# Patient Record
Sex: MALE | Race: WHITE | Employment: OTHER | URBAN - METROPOLITAN AREA
[De-identification: names, ages, dates, MRNs, and addresses within clinical notes are randomized per-mention and may not be internally consistent; named-entity substitution may affect disease eponyms.]

---

## 2017-10-02 PROBLEM — R10.84 GENERALIZED ABDOMINAL PAIN: Status: ACTIVE | Noted: 2017-10-02

## 2017-10-02 PROBLEM — R11.2 N&V (NAUSEA AND VOMITING): Status: ACTIVE | Noted: 2017-10-02

## 2017-10-02 PROBLEM — K80.10 CHRONIC CALCULOUS CHOLECYSTITIS: Status: ACTIVE | Noted: 2017-10-02

## 2017-10-03 RX ORDER — CEFAZOLIN SODIUM IN 0.9 % NACL 2 G/50 ML
2 INTRAVENOUS SOLUTION, PIGGYBACK (ML) INTRAVENOUS ONCE
Status: CANCELLED | OUTPATIENT
Start: 2017-10-31 | End: 2017-10-31

## 2017-10-24 ENCOUNTER — HOSPITAL ENCOUNTER (OUTPATIENT)
Dept: SURGERY | Age: 62
Discharge: HOME | End: 2017-10-24
Payer: COMMERCIAL

## 2017-10-24 VITALS
SYSTOLIC BLOOD PRESSURE: 142 MMHG | HEART RATE: 82 BPM | DIASTOLIC BLOOD PRESSURE: 84 MMHG | TEMPERATURE: 98.8 F | BODY MASS INDEX: 27.11 KG/M2 | WEIGHT: 200.13 LBS | HEIGHT: 72 IN | RESPIRATION RATE: 18 BRPM | OXYGEN SATURATION: 98 %

## 2017-10-24 LAB
ALBUMIN SERPL-MCNC: 3.4 G/DL (ref 3.2–4.6)
ALBUMIN/GLOB SERPL: 0.9 {RATIO} (ref 1.2–3.5)
ALP SERPL-CCNC: 105 U/L (ref 50–136)
ALT SERPL-CCNC: 47 U/L (ref 12–65)
ANION GAP SERPL CALC-SCNC: 7 MMOL/L (ref 7–16)
AST SERPL-CCNC: 34 U/L (ref 15–37)
BILIRUB SERPL-MCNC: 0.8 MG/DL (ref 0.2–1.1)
BUN SERPL-MCNC: 17 MG/DL (ref 8–23)
CALCIUM SERPL-MCNC: 9.3 MG/DL (ref 8.3–10.4)
CHLORIDE SERPL-SCNC: 106 MMOL/L (ref 98–107)
CO2 SERPL-SCNC: 25 MMOL/L (ref 21–32)
CREAT SERPL-MCNC: 0.85 MG/DL (ref 0.8–1.5)
ERYTHROCYTE [DISTWIDTH] IN BLOOD BY AUTOMATED COUNT: 13 % (ref 11.9–14.6)
GLOBULIN SER CALC-MCNC: 4 G/DL (ref 2.3–3.5)
GLUCOSE SERPL-MCNC: 95 MG/DL (ref 65–100)
HCT VFR BLD AUTO: 45.8 % (ref 41.1–50.3)
HGB BLD-MCNC: 16.3 G/DL (ref 13.6–17.2)
MCH RBC QN AUTO: 31 PG (ref 26.1–32.9)
MCHC RBC AUTO-ENTMCNC: 35.6 G/DL (ref 31.4–35)
MCV RBC AUTO: 87.2 FL (ref 79.6–97.8)
PLATELET # BLD AUTO: 190 K/UL (ref 150–450)
PMV BLD AUTO: 11.6 FL (ref 10.8–14.1)
POTASSIUM SERPL-SCNC: 3.9 MMOL/L (ref 3.5–5.1)
PROT SERPL-MCNC: 7.4 G/DL (ref 6.3–8.2)
RBC # BLD AUTO: 5.25 M/UL (ref 4.23–5.67)
SODIUM SERPL-SCNC: 138 MMOL/L (ref 136–145)
WBC # BLD AUTO: 13.4 K/UL (ref 4.3–11.1)

## 2017-10-24 PROCEDURE — 80053 COMPREHEN METABOLIC PANEL: CPT | Performed by: SURGERY

## 2017-10-24 PROCEDURE — 85027 COMPLETE CBC AUTOMATED: CPT | Performed by: SURGERY

## 2017-10-24 RX ORDER — VALSARTAN 160 MG/1
160 TABLET ORAL
COMMUNITY
End: 2019-08-27

## 2017-10-24 RX ORDER — ATORVASTATIN CALCIUM 20 MG/1
20 TABLET, FILM COATED ORAL
COMMUNITY

## 2017-10-24 RX ORDER — CHOLECALCIFEROL TAB 125 MCG (5000 UNIT) 125 MCG
5000 TAB ORAL
COMMUNITY

## 2017-10-24 NOTE — PERIOP NOTES
Recent Results (from the past 12 hour(s))   CBC W/O DIFF    Collection Time: 10/24/17 11:00 AM   Result Value Ref Range    WBC 13.4 (H) 4.3 - 11.1 K/uL    RBC 5.25 4.23 - 5.67 M/uL    HGB 16.3 13.6 - 17.2 g/dL    HCT 45.8 41.1 - 50.3 %    MCV 87.2 79.6 - 97.8 FL    MCH 31.0 26.1 - 32.9 PG    MCHC 35.6 (H) 31.4 - 35.0 g/dL    RDW 13.0 11.9 - 14.6 %    PLATELET 213 438 - 745 K/uL    MPV 11.6 10.8 - 83.4 FL   METABOLIC PANEL, COMPREHENSIVE    Collection Time: 10/24/17 11:00 AM   Result Value Ref Range    Sodium 138 136 - 145 mmol/L    Potassium 3.9 3.5 - 5.1 mmol/L    Chloride 106 98 - 107 mmol/L    CO2 25 21 - 32 mmol/L    Anion gap 7 7 - 16 mmol/L    Glucose 95 65 - 100 mg/dL    BUN 17 8 - 23 MG/DL    Creatinine 0.85 0.8 - 1.5 MG/DL    GFR est AA >60 >60 ml/min/1.73m2    GFR est non-AA >60 >60 ml/min/1.73m2    Calcium 9.3 8.3 - 10.4 MG/DL    Bilirubin, total 0.8 0.2 - 1.1 MG/DL    ALT (SGPT) 47 12 - 65 U/L    AST (SGOT) 34 15 - 37 U/L    Alk. phosphatase 105 50 - 136 U/L    Protein, total 7.4 6.3 - 8.2 g/dL    Albumin 3.4 3.2 - 4.6 g/dL    Globulin 4.0 (H) 2.3 - 3.5 g/dL    A-G Ratio 0.9 (L) 1.2 - 3.5       Please note wbc result.  Results routed and called to Tee at office

## 2017-10-24 NOTE — PERIOP NOTES
Patient verified name, , and surgery as listed in [facility]. Patient provided medical/health information and PTA medications to the best of their ability. TYPE  CASE:11  Orders per surgeon: were Received  Labs per surgeon:cbc and cmp collected and results are in [facility]. Labs per anesthesia protocol: no additional  EKG  :  EKG is not required per protocol    Patient provided with and instructed on education handouts including Guide to Surgery, blood transfusions, pain management, and hand hygiene for the family and community, and [facility] brochure. Hibiclens  and instructions given per hospital policy. Instructed patient to continue previous medications as prescribed prior to surgery unless otherwise directed and to take the following medications the day of surgery according to anesthesia guidelines : levothyroxine . Instructed patient to hold  the following medications: all vitamins and supplements 7 days prior to surgery and all nsaids 5 days prior to surgery    Original medication prescription bottles were not visualized during patient appointment. Patient teach back successful and patient demonstrates knowledge of instruction.

## 2017-10-30 ENCOUNTER — ANESTHESIA EVENT (OUTPATIENT)
Dept: SURGERY | Age: 62
End: 2017-10-30
Payer: COMMERCIAL

## 2017-10-31 ENCOUNTER — ANESTHESIA (OUTPATIENT)
Dept: SURGERY | Age: 62
End: 2017-10-31
Payer: COMMERCIAL

## 2017-10-31 ENCOUNTER — HOSPITAL ENCOUNTER (OUTPATIENT)
Age: 62
Setting detail: OUTPATIENT SURGERY
Discharge: HOME | End: 2017-10-31
Attending: SURGERY | Admitting: SURGERY
Payer: COMMERCIAL

## 2017-10-31 VITALS
BODY MASS INDEX: 26.32 KG/M2 | RESPIRATION RATE: 14 BRPM | OXYGEN SATURATION: 98 % | SYSTOLIC BLOOD PRESSURE: 120 MMHG | DIASTOLIC BLOOD PRESSURE: 77 MMHG | TEMPERATURE: 98 F | WEIGHT: 194.31 LBS | HEART RATE: 64 BPM | HEIGHT: 72 IN

## 2017-10-31 PROCEDURE — 74011000250 HC RX REV CODE- 250

## 2017-10-31 PROCEDURE — 74011250636 HC RX REV CODE- 250/636

## 2017-10-31 PROCEDURE — 76210000020 HC REC RM PH II FIRST 0.5 HR: Performed by: SURGERY

## 2017-10-31 PROCEDURE — 77030020782 HC GWN BAIR PAWS FLX 3M -B: Performed by: NURSE ANESTHETIST, CERTIFIED REGISTERED

## 2017-10-31 PROCEDURE — 77030035048 HC TRCR ENDOSC OPTCL COVD -B: Performed by: SURGERY

## 2017-10-31 PROCEDURE — 77030008518 HC TBNG INSUF ENDO STRY -B: Performed by: SURGERY

## 2017-10-31 PROCEDURE — 76010000149 HC OR TIME 1 TO 1.5 HR: Performed by: SURGERY

## 2017-10-31 PROCEDURE — 74011000250 HC RX REV CODE- 250: Performed by: SURGERY

## 2017-10-31 PROCEDURE — 77030025088 HC APPL CLP LIG 1 COVD -B: Performed by: SURGERY

## 2017-10-31 PROCEDURE — 77030035044 HC TRCR ENDOSC VRSPRT BLDLSS COVD -C: Performed by: SURGERY

## 2017-10-31 PROCEDURE — 76210000063 HC OR PH I REC FIRST 0.5 HR: Performed by: SURGERY

## 2017-10-31 PROCEDURE — 77030008467 HC STPLR SKN COVD -B: Performed by: SURGERY

## 2017-10-31 PROCEDURE — 77030037892: Performed by: SURGERY

## 2017-10-31 PROCEDURE — 77030000038 HC TIP SCIS LAPSCP SURI -B: Performed by: SURGERY

## 2017-10-31 PROCEDURE — 77030035051: Performed by: SURGERY

## 2017-10-31 PROCEDURE — 76060000033 HC ANESTHESIA 1 TO 1.5 HR: Performed by: SURGERY

## 2017-10-31 PROCEDURE — 77030019908 HC STETH ESOPH SIMS -A: Performed by: NURSE ANESTHETIST, CERTIFIED REGISTERED

## 2017-10-31 PROCEDURE — 77030032490 HC SLV COMPR SCD KNE COVD -B: Performed by: SURGERY

## 2017-10-31 PROCEDURE — 74011250636 HC RX REV CODE- 250/636: Performed by: ANESTHESIOLOGY

## 2017-10-31 PROCEDURE — 77030031139 HC SUT VCRL2 J&J -A: Performed by: SURGERY

## 2017-10-31 PROCEDURE — 77030011640 HC PAD GRND REM COVD -A: Performed by: SURGERY

## 2017-10-31 PROCEDURE — 88304 TISSUE EXAM BY PATHOLOGIST: CPT | Performed by: SURGERY

## 2017-10-31 PROCEDURE — 77030021158 HC TRCR BLN GELPRT AMR -B: Performed by: SURGERY

## 2017-10-31 PROCEDURE — 77030018836 HC SOL IRR NACL ICUM -A: Performed by: SURGERY

## 2017-10-31 PROCEDURE — 74011250637 HC RX REV CODE- 250/637: Performed by: ANESTHESIOLOGY

## 2017-10-31 PROCEDURE — 77030008477 HC STYL SATN SLP COVD -A: Performed by: NURSE ANESTHETIST, CERTIFIED REGISTERED

## 2017-10-31 PROCEDURE — 77030008703 HC TU ET UNCUF COVD -A: Performed by: NURSE ANESTHETIST, CERTIFIED REGISTERED

## 2017-10-31 RX ORDER — ACETAMINOPHEN 500 MG
1000 TABLET ORAL ONCE
Status: COMPLETED | OUTPATIENT
Start: 2017-10-31 | End: 2017-10-31

## 2017-10-31 RX ORDER — NEOSTIGMINE METHYLSULFATE 1 MG/ML
INJECTION INTRAVENOUS AS NEEDED
Status: DISCONTINUED | OUTPATIENT
Start: 2017-10-31 | End: 2017-10-31 | Stop reason: HOSPADM

## 2017-10-31 RX ORDER — BUPIVACAINE HYDROCHLORIDE 2.5 MG/ML
INJECTION, SOLUTION EPIDURAL; INFILTRATION; INTRACAUDAL AS NEEDED
Status: DISCONTINUED | OUTPATIENT
Start: 2017-10-31 | End: 2017-10-31 | Stop reason: HOSPADM

## 2017-10-31 RX ORDER — SODIUM CHLORIDE 0.9 % (FLUSH) 0.9 %
5-10 SYRINGE (ML) INJECTION AS NEEDED
Status: DISCONTINUED | OUTPATIENT
Start: 2017-10-31 | End: 2017-10-31 | Stop reason: HOSPADM

## 2017-10-31 RX ORDER — FLUMAZENIL 0.1 MG/ML
0.2 INJECTION INTRAVENOUS
Status: DISCONTINUED | OUTPATIENT
Start: 2017-10-31 | End: 2017-10-31 | Stop reason: HOSPADM

## 2017-10-31 RX ORDER — NALBUPHINE HYDROCHLORIDE 20 MG/ML
5 INJECTION, SOLUTION INTRAMUSCULAR; INTRAVENOUS; SUBCUTANEOUS
Status: DISCONTINUED | OUTPATIENT
Start: 2017-10-31 | End: 2017-10-31 | Stop reason: HOSPADM

## 2017-10-31 RX ORDER — LIDOCAINE HYDROCHLORIDE 10 MG/ML
0.1 INJECTION INFILTRATION; PERINEURAL AS NEEDED
Status: DISCONTINUED | OUTPATIENT
Start: 2017-10-31 | End: 2017-10-31 | Stop reason: HOSPADM

## 2017-10-31 RX ORDER — ONDANSETRON 2 MG/ML
INJECTION INTRAMUSCULAR; INTRAVENOUS AS NEEDED
Status: DISCONTINUED | OUTPATIENT
Start: 2017-10-31 | End: 2017-10-31 | Stop reason: HOSPADM

## 2017-10-31 RX ORDER — PROPOFOL 10 MG/ML
INJECTION, EMULSION INTRAVENOUS AS NEEDED
Status: DISCONTINUED | OUTPATIENT
Start: 2017-10-31 | End: 2017-10-31 | Stop reason: HOSPADM

## 2017-10-31 RX ORDER — HYDROMORPHONE HYDROCHLORIDE 2 MG/ML
0.5 INJECTION, SOLUTION INTRAMUSCULAR; INTRAVENOUS; SUBCUTANEOUS
Status: DISCONTINUED | OUTPATIENT
Start: 2017-10-31 | End: 2017-10-31 | Stop reason: HOSPADM

## 2017-10-31 RX ORDER — OXYCODONE HYDROCHLORIDE 5 MG/1
5 TABLET ORAL
Status: DISCONTINUED | OUTPATIENT
Start: 2017-10-31 | End: 2017-10-31 | Stop reason: HOSPADM

## 2017-10-31 RX ORDER — GLYCOPYRROLATE 0.2 MG/ML
INJECTION INTRAMUSCULAR; INTRAVENOUS AS NEEDED
Status: DISCONTINUED | OUTPATIENT
Start: 2017-10-31 | End: 2017-10-31 | Stop reason: HOSPADM

## 2017-10-31 RX ORDER — ROCURONIUM BROMIDE 10 MG/ML
INJECTION, SOLUTION INTRAVENOUS AS NEEDED
Status: DISCONTINUED | OUTPATIENT
Start: 2017-10-31 | End: 2017-10-31 | Stop reason: HOSPADM

## 2017-10-31 RX ORDER — MIDAZOLAM HYDROCHLORIDE 1 MG/ML
2 INJECTION, SOLUTION INTRAMUSCULAR; INTRAVENOUS
Status: DISCONTINUED | OUTPATIENT
Start: 2017-10-31 | End: 2017-10-31 | Stop reason: HOSPADM

## 2017-10-31 RX ORDER — GABAPENTIN 300 MG/1
300 CAPSULE ORAL ONCE
Status: COMPLETED | OUTPATIENT
Start: 2017-10-31 | End: 2017-10-31

## 2017-10-31 RX ORDER — CEFAZOLIN SODIUM IN 0.9 % NACL 2 G/50 ML
2 INTRAVENOUS SOLUTION, PIGGYBACK (ML) INTRAVENOUS ONCE
Status: COMPLETED | OUTPATIENT
Start: 2017-10-31 | End: 2017-10-31

## 2017-10-31 RX ORDER — LIDOCAINE HYDROCHLORIDE 20 MG/ML
INJECTION, SOLUTION EPIDURAL; INFILTRATION; INTRACAUDAL; PERINEURAL AS NEEDED
Status: DISCONTINUED | OUTPATIENT
Start: 2017-10-31 | End: 2017-10-31 | Stop reason: HOSPADM

## 2017-10-31 RX ORDER — SODIUM CHLORIDE, SODIUM LACTATE, POTASSIUM CHLORIDE, CALCIUM CHLORIDE 600; 310; 30; 20 MG/100ML; MG/100ML; MG/100ML; MG/100ML
100 INJECTION, SOLUTION INTRAVENOUS CONTINUOUS
Status: DISCONTINUED | OUTPATIENT
Start: 2017-10-31 | End: 2017-10-31 | Stop reason: HOSPADM

## 2017-10-31 RX ORDER — NALOXONE HYDROCHLORIDE 0.4 MG/ML
0.1 INJECTION, SOLUTION INTRAMUSCULAR; INTRAVENOUS; SUBCUTANEOUS
Status: DISCONTINUED | OUTPATIENT
Start: 2017-10-31 | End: 2017-10-31 | Stop reason: HOSPADM

## 2017-10-31 RX ORDER — FENTANYL CITRATE 50 UG/ML
INJECTION, SOLUTION INTRAMUSCULAR; INTRAVENOUS AS NEEDED
Status: DISCONTINUED | OUTPATIENT
Start: 2017-10-31 | End: 2017-10-31 | Stop reason: HOSPADM

## 2017-10-31 RX ORDER — SODIUM CHLORIDE 0.9 % (FLUSH) 0.9 %
5-10 SYRINGE (ML) INJECTION EVERY 8 HOURS
Status: DISCONTINUED | OUTPATIENT
Start: 2017-10-31 | End: 2017-10-31 | Stop reason: HOSPADM

## 2017-10-31 RX ADMIN — CEFAZOLIN 2 G: 1 INJECTION, POWDER, FOR SOLUTION INTRAMUSCULAR; INTRAVENOUS; PARENTERAL at 12:39

## 2017-10-31 RX ADMIN — NEOSTIGMINE METHYLSULFATE 3 MG: 1 INJECTION INTRAVENOUS at 13:13

## 2017-10-31 RX ADMIN — ACETAMINOPHEN 1000 MG: 500 TABLET, FILM COATED ORAL at 09:59

## 2017-10-31 RX ADMIN — ROCURONIUM BROMIDE 40 MG: 10 INJECTION, SOLUTION INTRAVENOUS at 12:32

## 2017-10-31 RX ADMIN — MIDAZOLAM HYDROCHLORIDE 2 MG: 1 INJECTION, SOLUTION INTRAMUSCULAR; INTRAVENOUS at 11:50

## 2017-10-31 RX ADMIN — ONDANSETRON 4 MG: 2 INJECTION INTRAMUSCULAR; INTRAVENOUS at 13:10

## 2017-10-31 RX ADMIN — GABAPENTIN 300 MG: 300 CAPSULE ORAL at 09:59

## 2017-10-31 RX ADMIN — SODIUM CHLORIDE, SODIUM LACTATE, POTASSIUM CHLORIDE, AND CALCIUM CHLORIDE 100 ML/HR: 600; 310; 30; 20 INJECTION, SOLUTION INTRAVENOUS at 08:49

## 2017-10-31 RX ADMIN — LIDOCAINE HYDROCHLORIDE 100 MG: 20 INJECTION, SOLUTION EPIDURAL; INFILTRATION; INTRACAUDAL; PERINEURAL at 12:31

## 2017-10-31 RX ADMIN — GLYCOPYRROLATE 0.4 MG: 0.2 INJECTION INTRAMUSCULAR; INTRAVENOUS at 13:13

## 2017-10-31 RX ADMIN — FENTANYL CITRATE 100 MCG: 50 INJECTION, SOLUTION INTRAMUSCULAR; INTRAVENOUS at 12:31

## 2017-10-31 RX ADMIN — PROPOFOL 200 MG: 10 INJECTION, EMULSION INTRAVENOUS at 12:31

## 2017-10-31 NOTE — OP NOTES
[facility] [address], [address] [facility]  [phone]    [address] REPORT    Name: Mayank Godinez. Date of Surgery: 10/31/2017  Med Record Number: [id number]   Age: 57 y.o. Sex: male   Pre-operative Diagnosis: Chronic Cholecystitis  Post-operative Diagnosis: same  Procedure: Laparoscopic Cholecystectomy  Surgeon: Lon Camargo MD  Asistants: none  Anesthesia:  General  Complications: none  Specimen: gallbladder to path  Estimated Blood Loss: <30cc    Procedure Description:   The risks, benefits, potential complications, treatment options, and expected outcomes were discussed with the patient pre-operatively. The patient voiced understanding and gave informed consent preoperatively. The patient was taken to the Operating Room, and the [address] [facility] time-out protocol checklist was followed. After the induction of adequate anesthesia, the abdomen was prepped and draped in the usual sterile fashion. Preoperative antibiotics were given. A sub umbilical incision was made and a cut down approach was used to place a blunt Maria D trochar under direct vision. After adequate pneumoperitioneum, two 5mm static and dynamic retraction ports were placed and an 11mm trochar also placed, all in the usual locations. The gallbladder was retracted and inflammatory adhesions to the inferior aspect of the gallbladder were taken down. Careful and tedious dissection was performed to free up the cystic duct and artery from the surrounding tissues. A clear window was created between the inferior aspect of the gallbladder wall, the cystic duct, and the cystic artery. The cystic duct could clearly be seen to enter the gallbladder and the cystic artery seen to traverse on the gallbladder wall toward the fundus of the gallbladder. The critical view of safety was achieved.    We placed 3 clips on the distal cystic duct (patient side) and 2 clips on the proximal (specimen side) of the cystic duct.  We then placed 2 clips on the proximal cystic artery and 2 clips on the distal cystic artery. Both structures were then divided. The cystic artery could be seen to pulsate at its clipped end as usual.  The gallbladder was the removed from its attachments to the liver. Small venous tributaries were clipped as needed as dissection was carried up toward the gallbladder fundus. The gallbladder was retracted out through the umbilical trochar suite with the camera placed temporarily through the epigastric trochar site. The gallbladder fossa was inspected. No bile leaks were seen, the clips on the artery and duct were intact and hemostatsis confirmed. Marcaine was infiltrated into the preperitoneal tissues around each trochar site. The fascia of the umbilical incision was closed with interrupted 0 vicryl suture. Clips were placed to close the skin incisions. The wounds were dressed sterilely with telfa and tegaderm, All sponge, instruments, and needle counts were correct. The patient was taken to recovery in good condition.     Margarita Garcia MD, FACS

## 2017-10-31 NOTE — ANESTHESIA POSTPROCEDURE EVALUATION
Post-Anesthesia Evaluation and Assessment    Patient: Holger Foster MRN: [id number]  SSN: [id number]    YOB: 1955  Age: 57 y.o. Sex: male       Cardiovascular Function/Vital Signs  Visit Vitals    /77 (BP 1 Location: Right arm, BP Patient Position: At rest)    Pulse 64    Temp 36.7 °C (98 °F)    Resp 14    Ht 6' (1.829 m)    Wt 88.1 kg (194 lb 5 oz)    SpO2 98%    BMI 26.35 kg/m2       Patient is status post general anesthesia for Procedure(s):  CHOLECYSTECTOMY LAPAROSCOPIC. Nausea/Vomiting: None    Postoperative hydration reviewed and adequate. Pain:  Pain Scale 1: Numeric (0 - 10) (10/31/17 1353)  Pain Intensity 1: 0 (10/31/17 1353)   Managed    Neurological Status:   Neuro (WDL): Within Defined Limits (10/31/17 1353)  Neuro  LUE Motor Response: Purposeful (10/31/17 1353)  LLE Motor Response: Purposeful (10/31/17 1353)  RUE Motor Response: Purposeful (10/31/17 1353)  RLE Motor Response: Purposeful (10/31/17 1353)   At baseline    Mental Status and Level of Consciousness: Arousable    Pulmonary Status:   O2 Device: Room air (10/31/17 1353)   Adequate oxygenation and airway patent    Complications related to anesthesia: None    Post-anesthesia assessment completed.  No concerns    Signed By: Asuncion Murphy MD     October 31, 2017

## 2017-10-31 NOTE — DISCHARGE INSTRUCTIONS
Leave dressings 6-7 days. Then remove, but keep incisions covered daily until follow-up. Try to keep incisions as dry as possible to lower risk of infection. No heavy lifting (>5lbs) for 6 weeks to reduce risk of developing a hernia in the incisions. No driving until you are off pain meds for 24hrs and have no pain with movements associated with driving. Pain prescription (Percocet) on chart for patient to use as needed for pain  Follow-up with Dr Мария Dove in 8 days on a Wednesday in the office at:  [address], [address]  (Call for an appt time-->[phone]-->option 2)    After general anesthesia or intravenous sedation, for 24 hours or while taking prescription Narcotics:  · Limit your activities  · Do not drive and operate hazardous machinery  · Do not make important personal or business decisions  · Do  not drink alcoholic beverages  · If you have not urinated within 8 hours after discharge, please contact your surgeon on call. *  Please give a list of your current medications to your Primary Care Provider. *  Please update this list whenever your medications are discontinued, doses are      changed, or new medications (including over-the-counter products) are added. *  Please carry medication information at all times in case of emergency situations. These are general instructions for a healthy lifestyle:  No smoking/ No tobacco products/ Avoid exposure to second hand smoke  Surgeon General's Warning:  Quitting smoking now greatly reduces serious risk to your health.   Obesity, smoking, and sedentary lifestyle greatly increases your risk for illness  A healthy diet, regular physical exercise & weight monitoring are important for maintaining a healthy lifestyle    You may be retaining fluid if you have a history of heart failure or if you experience any of the following symptoms:  Weight gain of 3 pounds or more overnight or 5 pounds in a week, increased swelling in our hands or feet or shortness of breath while lying flat in bed. Please call your doctor as soon as you notice any of these symptoms; do not wait until your next office visit. Recognize signs and symptoms of STROKE:    F-face looks uneven    A-arms unable to move or move unevenly    S-speech slurred or non-existent    T-time-call 911 as soon as signs and symptoms begin-DO NOT go       Back to bed or wait to see if you get better-TIME IS BRAIN.

## 2017-10-31 NOTE — IP AVS SNAPSHOT
[address] [place] [address] 
 
 
 [id number] 
[phone] Patient: Luana Hood. MRN: [id number] RNA:7[birth year]11[birth year] About your hospitalization You were admitted on:  October 31, 2017 You last received care in the:  [facility] PACU You were discharged on:  October 31, 2017 Why you were hospitalized Your primary diagnosis was:  Not on File Things You Need To Do (next 8 weeks) Thursday Nov 09, 2017 Global Post Op with Olivier De Dios MD at  1:30 PM  
Where:  [facility] Discharge Orders None A check bola indicates which time of day the medication should be taken. My Medications TAKE these medications as instructed Instructions Each Dose to Equal  
 Morning Noon Evening Bedtime  
 amLODIPine 5 mg tablet Commonly known as:  Wandra Maria C Your last dose was: Your next dose is: Take 5 mg by mouth nightly. 5 mg  
    
   
   
   
  
 atorvastatin 20 mg tablet Commonly known as:  LIPITOR Your last dose was: Your next dose is: Take 20 mg by mouth nightly. Indications: hypercholesterolemia 20 mg  
    
   
   
   
  
 cholecalciferol (VITAMIN D3) 5,000 unit Tab tablet Commonly known as:  VITAMIN D3 Your last dose was: Your next dose is: Take 5,000 Units by mouth daily (with lunch). 5000 Units COQ-10 & FISH OIL PO Your last dose was: Your next dose is: Take  by mouth. CURCUMIN Your last dose was: Your next dose is: Take  by mouth daily. eluxadoline 75 mg tablet Commonly known as:  VIBERZI Your last dose was: Your next dose is: Take  by mouth two (2) times daily (with meals). famotidine 20 mg tablet Commonly known as:  PEPCID Your last dose was: Your next dose is: Take 20 mg by mouth daily (with lunch). 20 mg  
    
   
   
   
  
 magnesium 250 mg Tab Your last dose was: Your next dose is: Take  by mouth. PYCNOGENOL COMPLEX PO Your last dose was: Your next dose is: Take  by mouth daily as needed. raNITIdine 150 mg tablet Commonly known as:  ZANTAC Your last dose was: Your next dose is: Take 150 mg by mouth nightly. 150 mg SYNTHROID 88 mcg tablet Generic drug:  levothyroxine Your last dose was: Your next dose is: Take 88 mcg by mouth. 88 mcg  
    
   
   
   
  
 valsartan 160 mg tablet Commonly known as:  DIOVAN Your last dose was: Your next dose is: Take 160 mg by mouth nightly. 160 mg Discharge Instructions Leave dressings 6-7 days. Then remove, but keep incisions covered daily until follow-up. Try to keep incisions as dry as possible to lower risk of infection. No heavy lifting (>5lbs) for 6 weeks to reduce risk of developing a hernia in the incisions. No driving until you are off pain meds for 24hrs and have no pain with movements associated with driving. Pain prescription (Percocet) on chart for patient to use as needed for pain Follow-up with Dr Pedro Pablo Wu in 8 days on a Wednesday in the office at: 
[facility] [address], [address] 
(Call for an appt time-->[phone]-->option 2) After general anesthesia or intravenous sedation, for 24 hours or while taking prescription Narcotics: · Limit your activities · Do not drive and operate hazardous machinery · Do not make important personal or business decisions · Do  not drink alcoholic beverages · If you have not urinated within 8 hours after discharge, please contact your surgeon on call. *  Please give a list of your current medications to your Primary Care Provider. *  Please update this list whenever your medications are discontinued, doses are 
    changed, or new medications (including over-the-counter products) are added. *  Please carry medication information at all times in case of emergency situations. These are general instructions for a healthy lifestyle: No smoking/ No tobacco products/ Avoid exposure to second hand smoke Surgeon General's Warning:  Quitting smoking now greatly reduces serious risk to your health. Obesity, smoking, and sedentary lifestyle greatly increases your risk for illness A healthy diet, regular physical exercise & weight monitoring are important for maintaining a healthy lifestyle You may be retaining fluid if you have a history of heart failure or if you experience any of the following symptoms:  Weight gain of 3 pounds or more overnight or 5 pounds in a week, increased swelling in our hands or feet or shortness of breath while lying flat in bed. Please call your doctor as soon as you notice any of these symptoms; do not wait until your next office visit. Recognize signs and symptoms of STROKE: 
 
F-face looks uneven A-arms unable to move or move unevenly S-speech slurred or non-existent T-time-call 911 as soon as signs and symptoms begin-DO NOT go Back to bed or wait to see if you get better-TIME IS BRAIN. Introducing [facility]! Dear Beatrice Yu: Thank you for requesting a [employer] account. Our records indicate that you already have an active [employer] account. You can access your account anytime at [email or web address] for Google Glass/Ayannah Did you know that you can access your hospital and ER discharge instructions at any time in [employer]?   You can also review all of your test results from your hospital stay or ER visit. Additional Information If you have questions, please visit the Frequently Asked Questions section of the [employer] website at [email or web address] ([email or web address]). [email or web address] [email or web address]/. Remember, MyChart is NOT to be used for urgent needs. For medical emergencies, dial 911. Now available from your iPhone and Android! Providers Seen During Your Hospitalization Provider Specialty Primary office phone Elicia Chaidez MD General Surgery [phone] Your Primary Care Physician (PCP) Primary Care Physician Office Phone Office Fax Genaro Diane [phone] You are allergic to the following Allergen Reactions Pcn (Penicillins) Unknown (comments) Patient states he was 3 and is unsure of reaction. Patient is able to take Amoxicillin without a reaction. Sulfa (Sulfonamide Antibiotics) Rash Recent Documentation Height Weight BMI Smoking Status 1.829 m 88.1 kg 26.35 kg/m2 Never Smoker Emergency Contacts Name Discharge Info Relation Home Work Mobile Miroslava Garcia  Parent [1] [phone] Patient Belongings The following personal items are in your possession at time of discharge: 
  Dental Appliances: None         Home Medications: None   Jewelry: None  Clothing: Shirt, Pants, Undergarments, Footwear    Other Valuables: None Please provide this summary of care documentation to your next provider. Signatures-by signing, you are acknowledging that this After Visit Summary has been reviewed with you and you have received a copy. Patient Signature:  ____________________________________________________________ Date:  ____________________________________________________________  
  
Cynthia Serrano Provider Signature:  ____________________________________________________________ Date:  ____________________________________________________________

## 2017-10-31 NOTE — INTERVAL H&P NOTE
H&P Update:  Drew Aceves was seen and examined. History and physical has been reviewed. The patient has been examined.  There have been no significant clinical changes since the completion of the originally dated History and Physical.    Signed By: Stacey Guajardo MD     October 31, 2017 12:18 PM

## 2017-10-31 NOTE — ANESTHESIA PREPROCEDURE EVALUATION
Anesthetic History               Review of Systems / Medical History  Nursing notes reviewed and pertinent labs reviewed    Pulmonary  Within defined limits                 Neuro/Psych   Within defined limits           Cardiovascular    Hypertension: well controlled              Exercise tolerance: >4 METS  Comments: Denies recent CP, SOB or Palpitations   GI/Hepatic/Renal     GERD: well controlled          Comments: IBS Endo/Other      Hypothyroidism: well controlled       Other Findings   Comments: Factor 5 Leiden Mutation Heterozygous           Physical Exam    Airway  Mallampati: II  TM Distance: > 6 cm  Neck ROM: normal range of motion   Mouth opening: Normal     Cardiovascular  Regular rate and rhythm,  S1 and S2 normal,  no murmur, click, rub, or gallop             Dental  No notable dental hx       Pulmonary  Breath sounds clear to auscultation               Abdominal  GI exam deferred       Other Findings            Anesthetic Plan    ASA: 2  Anesthesia type: general          Induction: Intravenous  Anesthetic plan and risks discussed with: Patient

## 2017-10-31 NOTE — H&P (VIEW-ONLY)
H&P/Consult Note/Progress Note/Office Note:   Marcia Jean-Baptiste MRN: [id number]  MJF:[birth year]  Age:62 y.o.    HPI: Marcia Jean-Baptiste is a 57 y.o. male who was referred to the office for evaluation of gallstones. He reports that over the last several years he has had intermittent episodes of diffuse generalized abdominal pain    These attacks of pain have become more progressive lately and he has one about every 6 weeks now. He reports the pain episodes are brought on by eating. He especially notes difficulty with barbecue. He has difficulty localizing the pain thinks that it starts around the umbilical region and radiates across the entire abdomen. He also has pain in his right lower chest and right flank. The symptoms of pain are associated with nausea vomiting and occasionally diarrhea. He is status post EGD in 2016. He reports this is unremarkable. He is status post colonoscopy in approximately 2013 and reports this was unremarkable as well. He is s/p open appendectomy in 1963. He had CT imaging of the abdomen and pelvis with and without contrast at [facility] in [place] on 8/24/2017. This study showed normal enhancement of the liver spleen and pancreas with slight irregularity of the gallbladder wall. There was mild thickening or nodularity at or along the wall of the gallbladder which may reflect hyperplastic cholecystosis, tiny polyps, or even tiny adherent noncalcified gallstones. No calcified gallstones were seen. The study was unable to exclude tiny polyps or tiny adherent gallstones. No about dilated bile ducts were seen. There was moderate large bowel diverticulosis. He had no abdominal wall hernia. BPH was noted. Increased adipose of both inguinal canals was noted. Chest x-ray on 8/24/2017 identified no acute cardiopulmonary abnormalities.         Past Medical History:   Diagnosis Date    GERD (gastroesophageal reflux disease)     Hypertension     Thyroid disease      Past Surgical History:   Procedure Laterality Date    HX APPENDECTOMY      HX HEENT      deviated septum    HX UROLOGICAL      hydrocelce     Current Outpatient Prescriptions   Medication Sig    amLODIPine (NORVASC) 5 mg tablet Take 5 mg by mouth.  valsartan (DIOVAN) 160 mg tablet daily.  levothyroxine (SYNTHROID) 88 mcg tablet Take 88 mcg by mouth.  raNITIdine (ZANTAC) 150 mg tablet Take 150 mg by mouth.  famotidine (PEPCID) 20 mg tablet Take 20 mg by mouth.  eluxadoline (VIBERZI) 75 mg tablet Take  by mouth.  magnesium 250 mg tab Take  by mouth.  UBIDECARENONE/OMEGA-3/VIT E (COQ-10 & FISH OIL PO) Take  by mouth.  TURMERIC (CURCUMIN) by Does Not Apply route.  vitamin a-vitamin c-vit e-min (OCUVITE) tablet Take 1 Tab by mouth daily.  PROCYAN OLIG/UBI/VIT A/HB#155 (PYCNOGENOL COMPLEX PO) Take  by mouth.  ASTAXANTHIN PO Take  by mouth. No current facility-administered medications for this visit. Pcn [penicillins] and Sulfa (sulfonamide antibiotics)  Social History     Social History    Marital status: SINGLE     Spouse name: N/A    Number of children: N/A    Years of education: N/A     Social History Main Topics    Smoking status: Never Smoker    Smokeless tobacco: None    Alcohol use None    Drug use: None    Sexual activity: Not Asked     Other Topics Concern    None     Social History Narrative    None     History   Smoking Status    Never Smoker   Smokeless Tobacco    Not on file     Family History   Problem Relation Age of Onset    Cancer Mother      breast     ROS: The patient has no difficulty with chest pain or shortness of breath. No fever or chills. Comprehensive review of systems was otherwise unremarkable except as noted above.     Physical Exam:   Visit Vitals    /88    Resp 18    Ht 6' (1.829 m)    Wt 199 lb 8 oz (90.5 kg)    BMI 27.06 kg/m2     Constitutional: Alert, oriented, cooperative patient in no acute distress; appears stated age    Eyes:Sclera are clear. EOMs intact  ENMT: no external lesions gross hearing normal; no obvious neck masses, no ear or lip lesions, nares normal  CV: RRR. Normal perfusion  Resp: No JVD. Breathing is  non-labored; no audible wheezing. GI: soft and non-distended; healed RLQ incision (appy)     Musculoskeletal: unremarkable with normal function. No embolic signs or cyanosis. Neuro:  Oriented; moves all 4; no focal deficits  Psychiatric: normal affect and mood, no memory impairment    Recent vitals (if inpt):  @IPVITALS(24:)@    Labs:  No results for input(s): WBC, HGB, PLT, NA, K, CL, CO2, BUN, CREA, GLU, PTP, INR, APTT, TBIL, TBILI, CBIL, SGOT, GPT, ALT, AP, AML, LPSE, LCAD, NH4, TROPT, TROIQ, PCO2, PO2, HCO3, HGBEXT, PLTEXT, HGBEXT, PLTEXT in the last 72 hours. No lab exists for component:  PH, INREXT, INREXT    No results found for: WBC, HGB, PLT, NA, K, CL, CO2, BUN, CREA, GLU, INR, APTT, TBIL, TBILI, CBIL, SGOT, GPT, ALT, AP, AML, LPSE, LCAD, NH4, TROPT, TROIQ, HGBEXT, PLTEXT, HGBEXT, PLTEXT    I reviewed recent labs and recent radiologic studies. I independently reviewed radiology images for studies I described above or studies I have ordered. Admission date (for inpatients): (Not on file)   [unfilled]  [unfilled]    ASSESSMENT/PLAN:  Problem List  Date Reviewed: 10/2/2017          Codes Class Noted    Generalized abdominal pain ICD-10-CM: R10.84  ICD-9-CM: 789.07  10/2/2017        N&V (nausea and vomiting) ICD-10-CM: R11.2  ICD-9-CM: 787.01  10/2/2017        Chronic calculous cholecystitis ICD-10-CM: K80.10  ICD-9-CM: 574.10  10/2/2017            Active Problems:    * No active hospital problems. *       I recommended he have his PSA checked regularly with his primary care physician given that BPH was noted on his CT imaging. We discussed this symptoms, findings, and condition at length in the office. I offered laparoscopic cholecystectomy.    I discussed with him the possibility that his pain symptoms could persist postoperatively. e also discussed operative risks including bleeding, infection, bile leak, injury to bowel or bile ducts, blood clots, risks of anesthesia, etc.. We also discussed risks of not having surgery including pancreatitis cholangitis and progression to acute calculus cholecystitis. He is in favor proceeding with cholecystectomy. We will schedule this for him soon.                       Signed:  Main Daniels MD,  FACS

## 2017-11-09 PROBLEM — R11.2 N&V (NAUSEA AND VOMITING): Status: RESOLVED | Noted: 2017-10-02 | Resolved: 2017-11-09

## 2017-11-09 PROBLEM — R10.84 GENERALIZED ABDOMINAL PAIN: Status: RESOLVED | Noted: 2017-10-02 | Resolved: 2017-11-09

## 2019-05-20 PROBLEM — E78.5 DYSLIPIDEMIA: Status: ACTIVE | Noted: 2019-05-20

## 2019-05-20 PROBLEM — R07.89 CHEST DISCOMFORT: Status: ACTIVE | Noted: 2019-05-20

## 2019-05-22 PROBLEM — I10 ESSENTIAL HYPERTENSION: Status: ACTIVE | Noted: 2019-05-22

## 2019-07-24 ENCOUNTER — HOSPITAL ENCOUNTER (OUTPATIENT)
Dept: CT IMAGING | Age: 64
Discharge: HOME | End: 2019-07-24
Attending: INTERNAL MEDICINE
Payer: SELF-PAY

## 2019-07-24 DIAGNOSIS — E78.5 DYSLIPIDEMIA: ICD-10-CM

## 2019-07-24 DIAGNOSIS — I10 ESSENTIAL HYPERTENSION: ICD-10-CM

## 2019-07-24 PROCEDURE — 75571 CT HRT W/O DYE W/CA TEST: CPT

## 2019-08-27 PROBLEM — R93.1 AGATSTON CORONARY ARTERY CALCIUM SCORE LESS THAN 100: Status: ACTIVE | Noted: 2019-08-27

## 2022-03-18 PROBLEM — R07.89 CHEST DISCOMFORT: Status: ACTIVE | Noted: 2019-05-20

## 2022-03-18 PROBLEM — E78.5 DYSLIPIDEMIA: Status: ACTIVE | Noted: 2019-05-20

## 2022-03-18 PROBLEM — I10 ESSENTIAL HYPERTENSION: Status: ACTIVE | Noted: 2019-05-22

## 2022-03-19 PROBLEM — R93.1 AGATSTON CORONARY ARTERY CALCIUM SCORE LESS THAN 100: Status: ACTIVE | Noted: 2019-08-27

## 2022-03-20 PROBLEM — K80.10 CHRONIC CALCULOUS CHOLECYSTITIS: Status: ACTIVE | Noted: 2017-10-02

## 2022-08-15 ENCOUNTER — OFFICE VISIT (OUTPATIENT)
Dept: CARDIOLOGY CLINIC | Age: 67
End: 2022-08-15
Payer: MEDICARE

## 2022-08-15 VITALS
SYSTOLIC BLOOD PRESSURE: 114 MMHG | HEIGHT: 72 IN | WEIGHT: 197.4 LBS | HEART RATE: 74 BPM | DIASTOLIC BLOOD PRESSURE: 80 MMHG | BODY MASS INDEX: 26.74 KG/M2

## 2022-08-15 DIAGNOSIS — I10 ESSENTIAL HYPERTENSION: ICD-10-CM

## 2022-08-15 DIAGNOSIS — R93.1 AGATSTON CORONARY ARTERY CALCIUM SCORE LESS THAN 100: Primary | ICD-10-CM

## 2022-08-15 DIAGNOSIS — E78.5 DYSLIPIDEMIA: ICD-10-CM

## 2022-08-15 PROCEDURE — G8428 CUR MEDS NOT DOCUMENT: HCPCS | Performed by: INTERNAL MEDICINE

## 2022-08-15 PROCEDURE — 4004F PT TOBACCO SCREEN RCVD TLK: CPT | Performed by: INTERNAL MEDICINE

## 2022-08-15 PROCEDURE — 99214 OFFICE O/P EST MOD 30 MIN: CPT | Performed by: INTERNAL MEDICINE

## 2022-08-15 PROCEDURE — 3017F COLORECTAL CA SCREEN DOC REV: CPT | Performed by: INTERNAL MEDICINE

## 2022-08-15 PROCEDURE — 1123F ACP DISCUSS/DSCN MKR DOCD: CPT | Performed by: INTERNAL MEDICINE

## 2022-08-15 PROCEDURE — G8417 CALC BMI ABV UP PARAM F/U: HCPCS | Performed by: INTERNAL MEDICINE

## 2022-08-15 RX ORDER — METOPROLOL SUCCINATE 50 MG/1
50 TABLET, EXTENDED RELEASE ORAL DAILY
Qty: 90 TABLET | Refills: 3 | Status: SHIPPED | OUTPATIENT
Start: 2022-08-15

## 2022-08-15 ASSESSMENT — ENCOUNTER SYMPTOMS
SHORTNESS OF BREATH: 0
ABDOMINAL PAIN: 0

## 2022-08-15 NOTE — PROGRESS NOTES
[address], [address] [facility], [address] [place] [address]  PHONE: [phone]    Marilyn Wise  1955      SUBJECTIVE:   Marilyn Wise is a 69 y.o. male seen for a follow up visit regarding the following:     Chief Complaint   Patient presents with    Hypertension     6mth follow up        HPI:    78-year-old gentleman comes back for follow-up of a history of elevated calcium score around 66 hypertension hyperlipidemia. Is been getting along very well with no anginal symptoms at all. Past Medical History, Past Surgical History, Family history, Social History, and Medications were all reviewed with the patient today and updated as necessary. Allergies   Allergen Reactions    Penicillins Other (See Comments)     Patient states he was 5 and is unsure of reaction. Patient is able to take Amoxicillin without a reaction. Sulfa Antibiotics Rash     Past Medical History:   Diagnosis Date    Factor 5 Leiden mutation, heterozygous ([address]     GERD (gastroesophageal reflux disease)     Hypertension     managed with meds    IBS (irritable bowel syndrome)     Nausea & vomiting     Thyroid disease     hypo     Past Surgical History:   Procedure Laterality Date    APPENDECTOMY      CHOLECYSTECTOMY      COLONOSCOPY      HEENT      deviated septum    UROLOGICAL SURGERY      hydrocelce     Family History   Problem Relation Age of Onset    Other Father         Factor 5 gene    Cancer Mother         breast    Pulmonary Embolism Father       Social History     Tobacco Use    Smoking status: Never    Smokeless tobacco: Never   Substance Use Topics    Alcohol use: No       ROS:    Review of Systems   Constitutional: Negative for decreased appetite and weight loss. Cardiovascular:  Negative for chest pain, dyspnea on exertion, irregular heartbeat and leg swelling. Respiratory:  Negative for shortness of breath. Hematologic/Lymphatic: Negative for bleeding problem. Gastrointestinal:  Negative for abdominal pain. PHYSICAL EXAM:    /80   Pulse 74   Ht 6' (1.829 m)   Wt 197 lb 6.4 oz (89.5 kg)   BMI 26.77 kg/m²        Wt Readings from Last 3 Encounters:   08/15/22 197 lb 6.4 oz (89.5 kg)   02/07/22 200 lb 9.6 oz (91 kg)   08/02/21 201 lb 12.8 oz (91.5 kg)     BP Readings from Last 3 Encounters:   08/15/22 114/80   02/07/22 (!) 140/86   08/02/21 112/76         Physical Exam  Constitutional:       General: He is not in acute distress. Cardiovascular:      Rate and Rhythm: Normal rate and regular rhythm. Heart sounds: No murmur heard. No gallop. Pulmonary:      Effort: Pulmonary effort is normal.      Breath sounds: No rales. Musculoskeletal:         General: No swelling. Neurological:      Mental Status: He is alert. Medical problems and test results were reviewed with the patient today. MIKE and Leobardo Cisneros was seen today for hypertension. Diagnoses and all orders for this visit:    Agatston coronary artery calcium score less than 100 he is asymptomatic at this time    Essential hypertension been stable. His family doctor increased his medication slightly with the Norvasc 10 mg dose. Dyslipidemia LDL levels been stable currently on atorvastatin 20 we will continue that dose    Other orders  -     metoprolol succinate (TOPROL XL) 50 MG extended release tablet; Take 1 tablet by mouth in the morning. [unfilled]      No follow-up provider specified.     Chaparro Herrera MD  8/15/2022  3:48 PM

## 2024-10-10 ENCOUNTER — APPOINTMENT (RX ONLY)
Dept: URBAN - METROPOLITAN AREA CLINIC 23 | Facility: CLINIC | Age: 69
Setting detail: DERMATOLOGY
End: 2024-10-10

## 2024-10-10 DIAGNOSIS — L71.8 OTHER ROSACEA: ICD-10-CM

## 2024-10-10 DIAGNOSIS — D18.0 HEMANGIOMA: ICD-10-CM

## 2024-10-10 DIAGNOSIS — Z71.89 OTHER SPECIFIED COUNSELING: ICD-10-CM

## 2024-10-10 DIAGNOSIS — L21.8 OTHER SEBORRHEIC DERMATITIS: ICD-10-CM

## 2024-10-10 PROCEDURE — ? COUNSELING

## 2024-10-10 PROCEDURE — ? PRESCRIPTION

## 2024-10-10 RX ORDER — PHARMACY COMPOUNDING ACCESSORY
EACH MISCELLANEOUS
Qty: 1 | Refills: 11 | Status: ACTIVE

## 2024-10-10 RX ORDER — KETOCONAZOLE 20 MG/G
CREAM TOPICAL
Qty: 30 | Refills: 11 | Status: ACTIVE

## 2024-10-10 ASSESSMENT — LOCATION SIMPLE DESCRIPTION DERM
LOCATION SIMPLE: SCALP
LOCATION SIMPLE: LEFT SCALP
LOCATION SIMPLE: RIGHT CHEEK
LOCATION SIMPLE: RIGHT UPPER BACK
LOCATION SIMPLE: ABDOMEN
LOCATION SIMPLE: LEFT CHEEK

## 2024-10-10 ASSESSMENT — LOCATION DETAILED DESCRIPTION DERM
LOCATION DETAILED: RIGHT INFERIOR MEDIAL MALAR CHEEK
LOCATION DETAILED: LEFT INFERIOR MEDIAL MALAR CHEEK
LOCATION DETAILED: LEFT CENTRAL MALAR CHEEK
LOCATION DETAILED: LEFT CENTRAL FRONTAL SCALP
LOCATION DETAILED: EPIGASTRIC SKIN
LOCATION DETAILED: RIGHT SUPERIOR PARIETAL SCALP
LOCATION DETAILED: RIGHT MEDIAL UPPER BACK
LOCATION DETAILED: RIGHT CENTRAL MALAR CHEEK

## 2024-10-10 ASSESSMENT — LOCATION ZONE DERM
LOCATION ZONE: SCALP
LOCATION ZONE: TRUNK
LOCATION ZONE: FACE

## 2024-11-11 ENCOUNTER — APPOINTMENT (RX ONLY)
Dept: URBAN - METROPOLITAN AREA CLINIC 23 | Facility: CLINIC | Age: 69
Setting detail: DERMATOLOGY
End: 2024-11-11

## 2024-11-11 DIAGNOSIS — L21.8 OTHER SEBORRHEIC DERMATITIS: ICD-10-CM | Status: IMPROVED

## 2024-11-11 DIAGNOSIS — L82.1 OTHER SEBORRHEIC KERATOSIS: ICD-10-CM

## 2024-11-11 DIAGNOSIS — Z71.89 OTHER SPECIFIED COUNSELING: ICD-10-CM

## 2024-11-11 DIAGNOSIS — D18.0 HEMANGIOMA: ICD-10-CM

## 2024-11-11 DIAGNOSIS — L71.8 OTHER ROSACEA: ICD-10-CM | Status: IMPROVED

## 2024-11-11 PROBLEM — D18.01 HEMANGIOMA OF SKIN AND SUBCUTANEOUS TISSUE: Status: ACTIVE | Noted: 2024-11-11

## 2024-11-11 PROCEDURE — ? COUNSELING

## 2024-11-11 PROCEDURE — 99214 OFFICE O/P EST MOD 30 MIN: CPT

## 2024-11-11 PROCEDURE — ? PRESCRIPTION MEDICATION MANAGEMENT

## 2024-11-11 ASSESSMENT — LOCATION DETAILED DESCRIPTION DERM
LOCATION DETAILED: RIGHT MEDIAL UPPER BACK
LOCATION DETAILED: RIGHT CENTRAL MALAR CHEEK
LOCATION DETAILED: LEFT INFERIOR MEDIAL MALAR CHEEK
LOCATION DETAILED: LEFT CENTRAL POSTAURICULAR SKIN
LOCATION DETAILED: EPIGASTRIC SKIN
LOCATION DETAILED: RIGHT SUPERIOR PARIETAL SCALP
LOCATION DETAILED: LEFT CENTRAL MALAR CHEEK
LOCATION DETAILED: LEFT CENTRAL FRONTAL SCALP
LOCATION DETAILED: LEFT LATERAL ABDOMEN
LOCATION DETAILED: RIGHT INFERIOR MEDIAL MALAR CHEEK

## 2024-11-11 ASSESSMENT — LOCATION SIMPLE DESCRIPTION DERM
LOCATION SIMPLE: SCALP
LOCATION SIMPLE: ABDOMEN
LOCATION SIMPLE: LEFT SCALP
LOCATION SIMPLE: RIGHT UPPER BACK
LOCATION SIMPLE: LEFT CHEEK
LOCATION SIMPLE: RIGHT CHEEK

## 2024-11-11 ASSESSMENT — LOCATION ZONE DERM
LOCATION ZONE: FACE
LOCATION ZONE: SCALP
LOCATION ZONE: TRUNK

## 2024-11-11 NOTE — PROCEDURE: PRESCRIPTION MEDICATION MANAGEMENT
Continue Regimen: Ketoconazole cream as needed, no refills needed
Render In Strict Bullet Format?: No
Detail Level: Zone
Continue Regimen: Triple rosacea cream(azlaic acid/metronidazole/ivermection 15/1/1%) daily , no refills needed

## 2025-02-18 ENCOUNTER — APPOINTMENT (OUTPATIENT)
Dept: GENERAL RADIOLOGY | Age: 70
DRG: 312 | End: 2025-02-18
Payer: MEDICARE

## 2025-02-18 ENCOUNTER — HOSPITAL ENCOUNTER (INPATIENT)
Age: 70
LOS: 2 days | Discharge: HOME | DRG: 312 | End: 2025-02-21
Attending: EMERGENCY MEDICINE | Admitting: INTERNAL MEDICINE
Payer: MEDICARE

## 2025-02-18 DIAGNOSIS — R55 SYNCOPE, UNSPECIFIED SYNCOPE TYPE: ICD-10-CM

## 2025-02-18 DIAGNOSIS — R55 SYNCOPE AND COLLAPSE: Primary | ICD-10-CM

## 2025-02-18 LAB
ALBUMIN SERPL-MCNC: 3.5 G/DL (ref 3.2–4.6)
ALBUMIN/GLOB SERPL: 1.1 (ref 1–1.9)
ALP SERPL-CCNC: 82 U/L (ref 40–129)
ALT SERPL-CCNC: 41 U/L (ref 8–55)
ANION GAP SERPL CALC-SCNC: 17 MMOL/L (ref 7–16)
APPEARANCE UR: CLEAR
ARTERIAL PATENCY WRIST A: POSITIVE
AST SERPL-CCNC: 31 U/L (ref 15–37)
BACTERIA URNS QL MICRO: NEGATIVE /HPF
BASE DEFICIT BLD-SCNC: 2.5 MMOL/L
BASOPHILS # BLD: 0.08 K/UL (ref 0–0.2)
BASOPHILS NFR BLD: 0.6 % (ref 0–2)
BDY SITE: ABNORMAL
BILIRUB SERPL-MCNC: 1 MG/DL (ref 0–1.2)
BILIRUB UR QL: NEGATIVE
BUN SERPL-MCNC: 14 MG/DL (ref 8–23)
CALCIUM SERPL-MCNC: 9.2 MG/DL (ref 8.8–10.2)
CHLORIDE SERPL-SCNC: 103 MMOL/L (ref 98–107)
CO2 SERPL-SCNC: 20 MMOL/L (ref 20–29)
COLOR UR: ABNORMAL
CREAT SERPL-MCNC: 1.27 MG/DL (ref 0.8–1.3)
DIFFERENTIAL METHOD BLD: ABNORMAL
EKG ATRIAL RATE: 106 BPM
EKG DIAGNOSIS: NORMAL
EKG P AXIS: 78 DEGREES
EKG P-R INTERVAL: 127 MS
EKG Q-T INTERVAL: 364 MS
EKG QRS DURATION: 85 MS
EKG QTC CALCULATION (BAZETT): 482 MS
EKG R AXIS: 72 DEGREES
EKG T AXIS: 76 DEGREES
EKG VENTRICULAR RATE: 105 BPM
EOSINOPHIL # BLD: 0.09 K/UL (ref 0–0.8)
EOSINOPHIL NFR BLD: 0.6 % (ref 0.5–7.8)
EPI CELLS #/AREA URNS HPF: ABNORMAL /HPF
ERYTHROCYTE [DISTWIDTH] IN BLOOD BY AUTOMATED COUNT: 13.1 % (ref 11.9–14.6)
GAS FLOW.O2 O2 DELIVERY SYS: ABNORMAL
GLOBULIN SER CALC-MCNC: 3.2 G/DL (ref 2.3–3.5)
GLUCOSE SERPL-MCNC: 152 MG/DL (ref 70–99)
GLUCOSE UR STRIP.AUTO-MCNC: NEGATIVE MG/DL
HCO3 BLD-SCNC: 19.5 MMOL/L (ref 22–26)
HCT VFR BLD AUTO: 44.2 % (ref 41.1–50.3)
HGB BLD-MCNC: 14.9 G/DL (ref 13.6–17.2)
HGB UR QL STRIP: ABNORMAL
HYALINE CASTS URNS QL MICRO: ABNORMAL /LPF
IMM GRANULOCYTES # BLD AUTO: 0.07 K/UL (ref 0–0.5)
IMM GRANULOCYTES NFR BLD AUTO: 0.5 % (ref 0–5)
KETONES UR QL STRIP.AUTO: NEGATIVE MG/DL
LACTATE SERPL-SCNC: 1.8 MMOL/L (ref 0.5–2)
LACTATE SERPL-SCNC: 3.3 MMOL/L (ref 0.5–2)
LACTATE SERPL-SCNC: 4.3 MMOL/L (ref 0.5–2)
LEUKOCYTE ESTERASE UR QL STRIP.AUTO: NEGATIVE
LYMPHOCYTES # BLD: 4.81 K/UL (ref 0.5–4.6)
LYMPHOCYTES NFR BLD: 33.4 % (ref 13–44)
MCH RBC QN AUTO: 29.7 PG (ref 26.1–32.9)
MCHC RBC AUTO-ENTMCNC: 33.7 G/DL (ref 31.4–35)
MCV RBC AUTO: 88 FL (ref 82–102)
MONOCYTES # BLD: 1.4 K/UL (ref 0.1–1.3)
MONOCYTES NFR BLD: 9.7 % (ref 4–12)
NEUTS SEG # BLD: 7.96 K/UL (ref 1.7–8.2)
NEUTS SEG NFR BLD: 55.2 % (ref 43–78)
NITRITE UR QL STRIP.AUTO: NEGATIVE
NRBC # BLD: 0 K/UL (ref 0–0.2)
O2/TOTAL GAS SETTING VFR VENT: 21 %
PCO2 BLD: 26.3 MMHG (ref 35–45)
PH BLD: 7.48 (ref 7.35–7.45)
PH UR STRIP: 6 (ref 5–9)
PLATELET # BLD AUTO: 202 K/UL (ref 150–450)
PMV BLD AUTO: 11.6 FL (ref 9.4–12.3)
PO2 BLD: 84 MMHG (ref 75–100)
POTASSIUM SERPL-SCNC: 3.3 MMOL/L (ref 3.5–5.1)
PROCALCITONIN SERPL-MCNC: 0.07 NG/ML (ref 0–0.1)
PROT SERPL-MCNC: 6.7 G/DL (ref 6.3–8.2)
PROT UR STRIP-MCNC: NEGATIVE MG/DL
RBC # BLD AUTO: 5.02 M/UL (ref 4.23–5.6)
RBC #/AREA URNS HPF: ABNORMAL /HPF
RESPIRATORY RATE, POC: 22 (ref 5–40)
SAO2 % BLD: 97.2 % (ref 95–98)
SERVICE CMNT-IMP: ABNORMAL
SODIUM SERPL-SCNC: 140 MMOL/L (ref 136–145)
SP GR UR REFRACTOMETRY: 1.01 (ref 1–1.02)
SPECIMEN TYPE: ABNORMAL
TROPONIN T SERPL HS-MCNC: 11 NG/L (ref 0–22)
TROPONIN T SERPL HS-MCNC: 11 NG/L (ref 0–22)
UROBILINOGEN UR QL STRIP.AUTO: 0.2 EU/DL (ref 0.2–1)
WBC # BLD AUTO: 14.4 K/UL (ref 4.3–11.1)
WBC URNS QL MICRO: ABNORMAL /HPF

## 2025-02-18 PROCEDURE — 87040 BLOOD CULTURE FOR BACTERIA: CPT

## 2025-02-18 PROCEDURE — 84145 PROCALCITONIN (PCT): CPT

## 2025-02-18 PROCEDURE — 2500000003 HC RX 250 WO HCPCS: Performed by: EMERGENCY MEDICINE

## 2025-02-18 PROCEDURE — 93010 ELECTROCARDIOGRAM REPORT: CPT | Performed by: INTERNAL MEDICINE

## 2025-02-18 PROCEDURE — 93005 ELECTROCARDIOGRAM TRACING: CPT | Performed by: EMERGENCY MEDICINE

## 2025-02-18 PROCEDURE — 96376 TX/PRO/DX INJ SAME DRUG ADON: CPT

## 2025-02-18 PROCEDURE — 96361 HYDRATE IV INFUSION ADD-ON: CPT

## 2025-02-18 PROCEDURE — 6360000002 HC RX W HCPCS: Performed by: EMERGENCY MEDICINE

## 2025-02-18 PROCEDURE — 71045 X-RAY EXAM CHEST 1 VIEW: CPT

## 2025-02-18 PROCEDURE — 6370000000 HC RX 637 (ALT 250 FOR IP): Performed by: EMERGENCY MEDICINE

## 2025-02-18 PROCEDURE — 81001 URINALYSIS AUTO W/SCOPE: CPT

## 2025-02-18 PROCEDURE — 2580000003 HC RX 258: Performed by: EMERGENCY MEDICINE

## 2025-02-18 PROCEDURE — 36600 WITHDRAWAL OF ARTERIAL BLOOD: CPT

## 2025-02-18 PROCEDURE — 96374 THER/PROPH/DIAG INJ IV PUSH: CPT

## 2025-02-18 PROCEDURE — 36415 COLL VENOUS BLD VENIPUNCTURE: CPT

## 2025-02-18 PROCEDURE — 85025 COMPLETE CBC W/AUTO DIFF WBC: CPT

## 2025-02-18 PROCEDURE — 82803 BLOOD GASES ANY COMBINATION: CPT

## 2025-02-18 PROCEDURE — 84484 ASSAY OF TROPONIN QUANT: CPT

## 2025-02-18 PROCEDURE — 99285 EMERGENCY DEPT VISIT HI MDM: CPT

## 2025-02-18 PROCEDURE — 83605 ASSAY OF LACTIC ACID: CPT

## 2025-02-18 PROCEDURE — 80053 COMPREHEN METABOLIC PANEL: CPT

## 2025-02-18 PROCEDURE — 96375 TX/PRO/DX INJ NEW DRUG ADDON: CPT

## 2025-02-18 RX ORDER — 0.9 % SODIUM CHLORIDE 0.9 %
500 INTRAVENOUS SOLUTION INTRAVENOUS ONCE
Status: COMPLETED | OUTPATIENT
Start: 2025-02-18 | End: 2025-02-18

## 2025-02-18 RX ORDER — ONDANSETRON 2 MG/ML
4 INJECTION INTRAMUSCULAR; INTRAVENOUS
Status: COMPLETED | OUTPATIENT
Start: 2025-02-18 | End: 2025-02-18

## 2025-02-18 RX ORDER — ONDANSETRON 2 MG/ML
4 INJECTION INTRAMUSCULAR; INTRAVENOUS ONCE
Status: COMPLETED | OUTPATIENT
Start: 2025-02-18 | End: 2025-02-18

## 2025-02-18 RX ORDER — SODIUM CHLORIDE, SODIUM LACTATE, POTASSIUM CHLORIDE, AND CALCIUM CHLORIDE .6; .31; .03; .02 G/100ML; G/100ML; G/100ML; G/100ML
30 INJECTION, SOLUTION INTRAVENOUS ONCE
Status: COMPLETED | OUTPATIENT
Start: 2025-02-18 | End: 2025-02-18

## 2025-02-18 RX ADMIN — SODIUM CHLORIDE, POTASSIUM CHLORIDE, SODIUM LACTATE AND CALCIUM CHLORIDE 2328 ML: 600; 310; 30; 20 INJECTION, SOLUTION INTRAVENOUS at 12:06

## 2025-02-18 RX ADMIN — ONDANSETRON 4 MG: 2 INJECTION, SOLUTION INTRAMUSCULAR; INTRAVENOUS at 10:21

## 2025-02-18 RX ADMIN — WATER 1000 MG: 1 INJECTION INTRAMUSCULAR; INTRAVENOUS; SUBCUTANEOUS at 13:14

## 2025-02-18 RX ADMIN — ONDANSETRON 4 MG: 2 INJECTION, SOLUTION INTRAMUSCULAR; INTRAVENOUS at 12:56

## 2025-02-18 RX ADMIN — APIXABAN 5 MG: 5 TABLET, FILM COATED ORAL at 15:46

## 2025-02-18 RX ADMIN — SODIUM CHLORIDE 500 ML: 9 INJECTION, SOLUTION INTRAVENOUS at 10:15

## 2025-02-18 ASSESSMENT — LIFESTYLE VARIABLES
HOW OFTEN DO YOU HAVE A DRINK CONTAINING ALCOHOL: NEVER
HOW MANY STANDARD DRINKS CONTAINING ALCOHOL DO YOU HAVE ON A TYPICAL DAY: PATIENT DOES NOT DRINK

## 2025-02-18 ASSESSMENT — PAIN SCALES - GENERAL: PAINLEVEL_OUTOF10: 0

## 2025-02-18 NOTE — ED TRIAGE NOTES
Pt arrives from this outpatient department. Pt was found on the elevator he was SOB, pale and shaky. Pt then had a syncopal episode while seated. Initial bp was 84/57. Pt also experienced episode of vomiting. Pt was d/c x3 weeks ago after being admitted for multiple PE s. Pt complains of SOB and lightheadedness at this time.

## 2025-02-18 NOTE — ED PROVIDER NOTES
ST-T changes.  No ectopy.  Normal QT interval  The patient was admitted and I have discussed patient management with the admitting provider.    SEP-1 CORE MEASURE    Fluid Resuscitation Rational: at least 30mL/kg based on entered actual weight at time of evaluation    Repeat Lactate Level: improving    Reassessment Exam: I completed the sepsis fluid reassessment on 2/18/25 2:16 PM EST.     Critical Care Procedure Note: 40 minutes of critical care time was performed in the emergency department.  This time was separate from any other procedures listed during the patients emergency department course. The failure to initiate these interventions on an urgent basis would likely have resulted in sudden, clinically significant or life-threatening deterioration in the patients condition.  The patient met the criteria for sepsis core measures implementation and this included parenteral fluid resuscitation, parental antibiotics, monitoring, and reassessments.         History     69-year-old gentleman was here at the hospital  patient being discharged when he had the onset of some palpitations associated lightheadedness shortness of breath abdominal cramping and nausea and vomiting.  He thinks he just had an episode of diarrhea as well.  No fever chills no abnormal bleeding.  Patient's past history is significant for hypertension.  Patient was also admitted less than a month ago because of pulmonary embolism.  He has factor V issues.  But states he is compliant with his Eliquis.  Became very lightheaded.  A rapid response was called and his blood pressure was in the 80s.  Minimal abdominal pain at this point.  No chest pain.  Chills but no fever.  He is unsure of any sick contacts.    Patient tells me he feels fatigued and lightheaded this morning.  Has had some issues with palpitations on and off.  He checked his heart rate with O2 sat meter this morning it varied between 120 and 140.    The history is provided by the  0.9 % bolus 500 mL (has no administration in time range)       New prescriptions:     New Prescriptions    No medications on file        Past History and Complexity:     Past Medical History:   Diagnosis Date    Factor 5 Leiden mutation, heterozygous (HCC)     GERD (gastroesophageal reflux disease)     Hypertension     managed with meds    IBS (irritable bowel syndrome)     Nausea & vomiting     Thyroid disease     hypo        Past Surgical History:   Procedure Laterality Date    APPENDECTOMY      CHOLECYSTECTOMY      COLONOSCOPY      HEENT      deviated septum    UROLOGICAL SURGERY      hydrocelce        Social History     Socioeconomic History    Marital status: Single   Tobacco Use    Smoking status: Never    Smokeless tobacco: Never   Substance and Sexual Activity    Alcohol use: No     Social Determinants of Health     Financial Resource Strain: Low Risk  (10/28/2024)    Received from [employer]    Financial Resource Strain     Difficulty Paying Living Expenses: Not hard at all     Difficulty Paying Medical Expenses: No   Food Insecurity: No Food Insecurity (10/28/2024)    Received from [employer]    Food Insecurity     Worried about Running Out of Food in the Last Year: Never true     Ran Out of Food in the Last Year: Never true   Transportation Needs: No Transportation Needs (10/28/2024)    Received from [employer]    Transportation Needs     Lack of Transportation: No   Physical Activity: Inactive (1/29/2025)    Received from [employer]    Physical Activity     Days of Exercise per Week: 0     Minutes of Exercise per Session: 0     Total Minutes of Exercise per Week: 0   Stress: Stress Concern Present (1/29/2025)    Received from [employer]    Stress     Feeling of Stress : Quite a bit   Social Connections: Socially Integrated (10/28/2024)    Received from [employer]    Social Connections     Frequency of Communication with Friends and Family: More than three times a week     Frequency of

## 2025-02-18 NOTE — PROGRESS NOTES
attended Rapid Response, per protocol.   provided prayer as medical staff cared for patient.  Patient was transferred to ER.   is available for follow up as needed.    Peace be with you,  Signed by  MYRIAM BorjaDiv.   [phone]

## 2025-02-19 ENCOUNTER — APPOINTMENT (OUTPATIENT)
Dept: ULTRASOUND IMAGING | Age: 70
DRG: 312 | End: 2025-02-19
Payer: MEDICARE

## 2025-02-19 ENCOUNTER — APPOINTMENT (OUTPATIENT)
Dept: NON INVASIVE DIAGNOSTICS | Age: 70
DRG: 312 | End: 2025-02-19
Attending: INTERNAL MEDICINE
Payer: MEDICARE

## 2025-02-19 PROBLEM — R55 SYNCOPE AND COLLAPSE: Status: ACTIVE | Noted: 2025-02-19

## 2025-02-19 LAB — POTASSIUM SERPL-SCNC: 4.5 MMOL/L (ref 3.5–5.1)

## 2025-02-19 PROCEDURE — 93880 EXTRACRANIAL BILAT STUDY: CPT | Performed by: RADIOLOGY

## 2025-02-19 PROCEDURE — 2580000003 HC RX 258: Performed by: INTERNAL MEDICINE

## 2025-02-19 PROCEDURE — 93880 EXTRACRANIAL BILAT STUDY: CPT

## 2025-02-19 PROCEDURE — 82384 ASSAY THREE CATECHOLAMINES: CPT

## 2025-02-19 PROCEDURE — 6370000000 HC RX 637 (ALT 250 FOR IP): Performed by: INTERNAL MEDICINE

## 2025-02-19 PROCEDURE — 84132 ASSAY OF SERUM POTASSIUM: CPT

## 2025-02-19 PROCEDURE — 2500000003 HC RX 250 WO HCPCS: Performed by: INTERNAL MEDICINE

## 2025-02-19 PROCEDURE — 36415 COLL VENOUS BLD VENIPUNCTURE: CPT

## 2025-02-19 PROCEDURE — 6360000002 HC RX W HCPCS: Performed by: INTERNAL MEDICINE

## 2025-02-19 PROCEDURE — 1100000003 HC PRIVATE W/ TELEMETRY

## 2025-02-19 PROCEDURE — 83835 ASSAY OF METANEPHRINES: CPT

## 2025-02-19 RX ORDER — ONDANSETRON 2 MG/ML
4 INJECTION INTRAMUSCULAR; INTRAVENOUS EVERY 6 HOURS PRN
Status: DISCONTINUED | OUTPATIENT
Start: 2025-02-19 | End: 2025-02-21 | Stop reason: HOSPADM

## 2025-02-19 RX ORDER — LEVOTHYROXINE SODIUM 88 UG/1
88 TABLET ORAL DAILY
Status: DISCONTINUED | OUTPATIENT
Start: 2025-02-19 | End: 2025-02-21 | Stop reason: HOSPADM

## 2025-02-19 RX ORDER — MAGNESIUM SULFATE IN WATER 40 MG/ML
2000 INJECTION, SOLUTION INTRAVENOUS PRN
Status: DISCONTINUED | OUTPATIENT
Start: 2025-02-19 | End: 2025-02-21 | Stop reason: HOSPADM

## 2025-02-19 RX ORDER — POLYETHYLENE GLYCOL 3350 17 G/17G
17 POWDER, FOR SOLUTION ORAL DAILY PRN
Status: DISCONTINUED | OUTPATIENT
Start: 2025-02-19 | End: 2025-02-21 | Stop reason: HOSPADM

## 2025-02-19 RX ORDER — VALSARTAN 320 MG/1
320 TABLET ORAL DAILY
Status: DISCONTINUED | OUTPATIENT
Start: 2025-02-19 | End: 2025-02-20

## 2025-02-19 RX ORDER — ATORVASTATIN CALCIUM 10 MG/1
20 TABLET, FILM COATED ORAL NIGHTLY
Status: DISCONTINUED | OUTPATIENT
Start: 2025-02-19 | End: 2025-02-21 | Stop reason: HOSPADM

## 2025-02-19 RX ORDER — MAGNESIUM HYDROXIDE/ALUMINUM HYDROXICE/SIMETHICONE 120; 1200; 1200 MG/30ML; MG/30ML; MG/30ML
30 SUSPENSION ORAL EVERY 6 HOURS PRN
Status: DISCONTINUED | OUTPATIENT
Start: 2025-02-19 | End: 2025-02-21 | Stop reason: HOSPADM

## 2025-02-19 RX ORDER — SODIUM CHLORIDE 9 MG/ML
INJECTION, SOLUTION INTRAVENOUS CONTINUOUS
Status: DISCONTINUED | OUTPATIENT
Start: 2025-02-19 | End: 2025-02-20

## 2025-02-19 RX ORDER — POTASSIUM CHLORIDE 1500 MG/1
40 TABLET, EXTENDED RELEASE ORAL PRN
Status: DISCONTINUED | OUTPATIENT
Start: 2025-02-19 | End: 2025-02-21 | Stop reason: HOSPADM

## 2025-02-19 RX ORDER — ACETAMINOPHEN 325 MG/1
650 TABLET ORAL EVERY 6 HOURS PRN
Status: DISCONTINUED | OUTPATIENT
Start: 2025-02-19 | End: 2025-02-21 | Stop reason: HOSPADM

## 2025-02-19 RX ORDER — METOPROLOL SUCCINATE 50 MG/1
50 TABLET, EXTENDED RELEASE ORAL DAILY
Status: DISCONTINUED | OUTPATIENT
Start: 2025-02-19 | End: 2025-02-21

## 2025-02-19 RX ORDER — AMLODIPINE BESYLATE 10 MG/1
10 TABLET ORAL DAILY
Status: DISCONTINUED | OUTPATIENT
Start: 2025-02-19 | End: 2025-02-21

## 2025-02-19 RX ORDER — SODIUM CHLORIDE 0.9 % (FLUSH) 0.9 %
5-40 SYRINGE (ML) INJECTION EVERY 12 HOURS SCHEDULED
Status: DISCONTINUED | OUTPATIENT
Start: 2025-02-19 | End: 2025-02-21 | Stop reason: HOSPADM

## 2025-02-19 RX ORDER — SODIUM CHLORIDE 9 MG/ML
INJECTION, SOLUTION INTRAVENOUS PRN
Status: DISCONTINUED | OUTPATIENT
Start: 2025-02-19 | End: 2025-02-21 | Stop reason: HOSPADM

## 2025-02-19 RX ORDER — ONDANSETRON 4 MG/1
4 TABLET, ORALLY DISINTEGRATING ORAL EVERY 8 HOURS PRN
Status: DISCONTINUED | OUTPATIENT
Start: 2025-02-19 | End: 2025-02-21 | Stop reason: HOSPADM

## 2025-02-19 RX ORDER — POTASSIUM CHLORIDE 7.45 MG/ML
10 INJECTION INTRAVENOUS PRN
Status: DISCONTINUED | OUTPATIENT
Start: 2025-02-19 | End: 2025-02-21 | Stop reason: HOSPADM

## 2025-02-19 RX ORDER — FAMOTIDINE 20 MG/1
10 TABLET, FILM COATED ORAL DAILY PRN
Status: DISCONTINUED | OUTPATIENT
Start: 2025-02-19 | End: 2025-02-21 | Stop reason: HOSPADM

## 2025-02-19 RX ORDER — ACETAMINOPHEN 650 MG/1
650 SUPPOSITORY RECTAL EVERY 6 HOURS PRN
Status: DISCONTINUED | OUTPATIENT
Start: 2025-02-19 | End: 2025-02-21 | Stop reason: HOSPADM

## 2025-02-19 RX ORDER — SODIUM CHLORIDE 0.9 % (FLUSH) 0.9 %
5-40 SYRINGE (ML) INJECTION PRN
Status: DISCONTINUED | OUTPATIENT
Start: 2025-02-19 | End: 2025-02-21 | Stop reason: HOSPADM

## 2025-02-19 RX ORDER — BISACODYL 10 MG
10 SUPPOSITORY, RECTAL RECTAL DAILY PRN
Status: DISCONTINUED | OUTPATIENT
Start: 2025-02-19 | End: 2025-02-21 | Stop reason: HOSPADM

## 2025-02-19 RX ORDER — FAMOTIDINE 20 MG/1
20 TABLET, FILM COATED ORAL 2 TIMES DAILY
COMMUNITY

## 2025-02-19 RX ADMIN — ACETAMINOPHEN 650 MG: 325 TABLET ORAL at 08:20

## 2025-02-19 RX ADMIN — LEVOTHYROXINE SODIUM 88 MCG: 0.09 TABLET ORAL at 07:12

## 2025-02-19 RX ADMIN — APIXABAN 5 MG: 5 TABLET, FILM COATED ORAL at 08:20

## 2025-02-19 RX ADMIN — CHOLECALCIFEROL TAB 125 MCG (5000 UNIT) 5000 UNITS: 125 TAB at 11:38

## 2025-02-19 RX ADMIN — SODIUM CHLORIDE: 9 INJECTION, SOLUTION INTRAVENOUS at 08:33

## 2025-02-19 RX ADMIN — WATER 1000 MG: 1 INJECTION INTRAMUSCULAR; INTRAVENOUS; SUBCUTANEOUS at 11:38

## 2025-02-19 RX ADMIN — APIXABAN 5 MG: 5 TABLET, FILM COATED ORAL at 20:24

## 2025-02-19 RX ADMIN — VALSARTAN 320 MG: 320 TABLET, FILM COATED ORAL at 20:24

## 2025-02-19 RX ADMIN — AMLODIPINE BESYLATE 10 MG: 10 TABLET ORAL at 20:24

## 2025-02-19 RX ADMIN — SODIUM CHLORIDE: 9 INJECTION, SOLUTION INTRAVENOUS at 23:42

## 2025-02-19 RX ADMIN — METOPROLOL SUCCINATE 50 MG: 50 TABLET, EXTENDED RELEASE ORAL at 08:20

## 2025-02-19 RX ADMIN — ATORVASTATIN CALCIUM 20 MG: 20 TABLET, FILM COATED ORAL at 20:24

## 2025-02-19 RX ADMIN — POTASSIUM BICARBONATE 40 MEQ: 782 TABLET, EFFERVESCENT ORAL at 16:14

## 2025-02-19 ASSESSMENT — PAIN DESCRIPTION - DESCRIPTORS: DESCRIPTORS: ACHING

## 2025-02-19 ASSESSMENT — PAIN SCALES - GENERAL
PAINLEVEL_OUTOF10: 2
PAINLEVEL_OUTOF10: 0

## 2025-02-19 ASSESSMENT — PAIN DESCRIPTION - LOCATION: LOCATION: HEAD

## 2025-02-19 NOTE — ED NOTES
TRANSFER - OUT REPORT:    Verbal report given to Sari OCONNOR on Juan C Roe  being transferred to [facility] for routine progression of patient care       Report consisted of patient's Situation, Background, Assessment and   Recommendations(SBAR).     Information from the following report(s) Nurse Handoff Report, ED Encounter Summary, and ED SBAR was reviewed with the receiving nurse.    Goodman Fall Assessment:    Presents to emergency department  because of falls (Syncope, seizure, or loss of consciousness): Yes  Age > 70: Yes  Altered Mental Status, Intoxication with alcohol or substance confusion (Disorientation, impaired judgment, poor safety awaremess, or inability to follow instructions): No  Impaired Mobility: Ambulates or transfers with assistive devices or assistance; Unable to ambulate or transer.: No  Nursing Judgement: Yes          Lines:   Peripheral IV 02/18/25 Distal;Right Cephalic (Active)        Opportunity for questions and clarification was provided.      Patient transported with:  Registered Nurse           Diane, Rahul, RN  02/19/25 0062

## 2025-02-19 NOTE — PROGRESS NOTES
4 Eyes Skin Assessment     NAME:  Juan C Roe  YOB: 1955  MEDICAL RECORD NUMBER:  [id number]    The patient is being assessed for  Admission    I agree that at least one RN has performed a thorough Head to Toe Skin Assessment on the patient. ALL assessment sites listed below have been assessed.      Areas assessed by both nurses:    Shoulders, Back, Chest, Arms, Elbows, Hands, Sacrum. Buttock, Coccyx, Ischium, and Legs. Feet and Heels        Does the Patient have a Wound? No noted wound(s)       Rolando Prevention initiated by RN: No  Wound Care Orders initiated by RN: No    Pressure Injury (Stage 3,4, Unstageable, DTI, NWPT, and Complex wounds) if present, place Wound referral order by RN under : No    New Ostomies, if present place, Ostomy referral order under : No     Nurse 1 eSignature: Electronically signed by DONOVAN CHAUDHARI RN on 2/19/25 at 4:54 PM EST    **SHARE this note so that the co-signing nurse can place an eSignature**    Nurse 2 eSignature: Electronically signed by MIGDALIA HAMMONDS RN on 2/20/25 at 4:57 PM EST     Patient called in to state that he went home after his OV with Celeste Simons CNP and found that he was not taking his Lasix as prescribed. Huddled with ONESIMO and she stated to try original plan of 20 mg Furosemide daily and call back in 1 week. He asked for a refill since he dropped his original medication bottle. 7 day prescription sent to [facility].     Kim Bacon RN

## 2025-02-19 NOTE — ED NOTES
Report received from Ebony OCONNOR to assume care at this time.       Rahul Diane, RN  02/18/25 [id number]

## 2025-02-19 NOTE — PROGRESS NOTES
Pt admitted after midnight.  Non-billable encounter.           Hospitalist Progress Note   Admit Date:  2025  9:46 AM   Name:  Juan C Roe   Age:  69 y.o.  Sex:  male  :  1955   MRN:  [id number]   Room:  [place] [address]    Presenting/Chief Complaint: Loss of Consciousness and Shortness of Breath     Reason(s) for Admission: Syncope and collapse [R55]     Hospital Course:   Juan C Roe is a 69 y.o. male with medical history of hypertension, dyslipidemia, GERD, IBS, history of factor V Leiden, hypothyroidism, vitamin D deficiency, apparently came to the hospital to  his friend who was having outpt surgery.  Pt went to the second floor and picked up some coffee and a muffin. He needed to use the bathroom. He has pulse oximeter which he carried and his hr was in the 120.  Both HR and BP have been fluctuating.  Pt was about to get in the elevator and a nurse pushing an empty wheel chair said \"you don't look so good\"  so she made him sit in the wheel chair.      From the wheel chair he had a syncopal episode and fell to the floor. They got a gurney and shipped hime to ER.  Meanwhile en route he threw up.  Pt ended up having a syncopal episode in the wheel chair.  While the nurses were trying to get a gurney to take him to ED, he ended up vomiting enroute to ER.     workup revealed white count of 14.4 with elevated lactic acid no definite source was found his UA was negative EKG was unrevealing and blood cultures x 2 was ordered empiric IV fluids and antibiotics started on the patient subsequently hospital service called to admit.  Patient is feeling better now denies any chest pain shortness of breath     Pt was followed by ENT since December with pieter.  Last month he was admitted and had bilateral PE and he is currently on eliquis      Subjective & 24hr Events:    - Pt sitting in bed, eating lunch.  States no new symptoms overnight.  Denies fevers/chills, n/v/d.    Long discussion about symptomology.   noted  Extremities: No cyanosis or clubbing.  No lower extremity edema  Skin:     No rashes.  Normal coloration.   Warm and dry.    Neuro:  CN II-XII grossly intact.    Psych:  Normal mood and affect.      I have personally reviewed labs and tests:  Recent Labs:  Recent Results (from the past 48 hour(s))   EKG 12 Lead    Collection Time: 02/18/25  9:56 AM   Result Value Ref Range    Ventricular Rate 105 BPM    Atrial Rate 106 BPM    P-R Interval 127 ms    QRS Duration 85 ms    Q-T Interval 364 ms    QTc Calculation (Bazett) 482 ms    P Axis 78 degrees    R Axis 72 degrees    T Axis 76 degrees    Diagnosis       Sinus tachycardia      Confirmed by Laci Patel MD ([id number]) on 2/18/2025 10:27:46 AM     CBC with Auto Differential    Collection Time: 02/18/25 10:17 AM   Result Value Ref Range    WBC 14.4 (H) 4.3 - 11.1 K/uL    RBC 5.02 4.23 - 5.6 M/uL    Hemoglobin 14.9 13.6 - 17.2 g/dL    Hematocrit 44.2 41.1 - 50.3 %    MCV 88.0 82 - 102 FL    MCH 29.7 26.1 - 32.9 PG    MCHC 33.7 31.4 - 35.0 g/dL    RDW 13.1 11.9 - 14.6 %    Platelets 202 150 - 450 K/uL    MPV 11.6 9.4 - 12.3 FL    nRBC 0.00 0.0 - 0.2 K/uL    Differential Type AUTOMATED      Neutrophils % 55.2 43.0 - 78.0 %    Lymphocytes % 33.4 13.0 - 44.0 %    Monocytes % 9.7 4.0 - 12.0 %    Eosinophils % 0.6 0.5 - 7.8 %    Basophils % 0.6 0.0 - 2.0 %    Immature Granulocytes % 0.5 0.0 - 5.0 %    Neutrophils Absolute 7.96 1.70 - 8.20 K/UL    Lymphocytes Absolute 4.81 (H) 0.50 - 4.60 K/UL    Monocytes Absolute 1.40 (H) 0.10 - 1.30 K/UL    Eosinophils Absolute 0.09 0.00 - 0.80 K/UL    Basophils Absolute 0.08 0.00 - 0.20 K/UL    Immature Granulocytes Absolute 0.07 0.0 - 0.5 K/UL   Comprehensive Metabolic Panel    Collection Time: 02/18/25 10:17 AM   Result Value Ref Range    Sodium 140 136 - 145 mmol/L    Potassium 3.3 (L) 3.5 - 5.1 mmol/L    Chloride 103 98 - 107 mmol/L    CO2 20 20 - 29 mmol/L    Anion Gap 17 (H) 7 - 16 mmol/L    Glucose 152 (H) 70 - 99 mg/dL

## 2025-02-19 NOTE — ACP (ADVANCE CARE PLANNING)
Advance Care Planning     Advance Care Planning Activator (Inpatient)  Conversation Note      Health Care Decision Maker:  No LW/HCPOA on file, patient aware legal next of kin remain decision maker until document provided.      Current Designated Health Care Decision Maker:     Primary Decision Maker: Juan C Roe Sr. - [facility] - [phone]    Primary Decision Maker: Jeanne Roe - Parent - [phone]      Care Preferences Full Code per MD order

## 2025-02-19 NOTE — H&P
Hospitalist History and Physical   Admit Date:  2025  9:46 AM   Name:  Juan C Roe   Age:  69 y.o.  Sex:  male  :  1955   MRN:  [id number]   [facility]    Presenting/Chief Complaint: Loss of Consciousness and Shortness of Breath     Reason(s) for Admission: syncope/palpitations    History of Present Illness:   Juan C Roe is a 69 y.o. male with medical history of hypertension, dyslipidemia, GERD, IBS, history of factor V Leiden, hypothyroidism, vitamin D deficiency, apparently came to the hospital to  his friend who was having outpt surgery.  Pt went to the second floor and picked up some coffee and a muffin. He needed to use the bathroom. He has pulse oximeter which he carried and his hr was in the 120.  Both HR and BP have been fluctuating.  Pt was about to get in the elevator and a nurse pushing an empty wheel chair said \"you don't look so good\"  so she made him sit in the wheel chair.     From the wheel chair he had a syncopal episode and fell to the floor. They got a gurney and shipped hime to ER.  Meanwhile en route he threw up.  Pt ended up having a syncopal episode in the wheel chair.  While the nurses were trying to get a gurney to take him to ED, he ended up vomiting enroute to ER.    workup revealed white count of 14.4 with elevated lactic acid no definite source was found his UA was negative EKG was unrevealing and blood cultures x 2 was ordered empiric IV fluids and antibiotics started on the patient subsequently hospital service called to admit.  Patient is feeling better now denies any chest pain shortness of breath    Pt was followed by ENT since December with pieter.  Last month he was admitted and had bilateral PE and he is currently on eliquis    Assessment & Plan:     Syncopal episode  Multifactorial sec to tachycardia, vagal response, sepsis  Empiric iv fluids and antibiotics.  Place the patient on telemetry  Monitor the rhythm       Factor V [place] positive/history

## 2025-02-19 NOTE — PROGRESS NOTES
TRANSFER - IN REPORT:    Verbal report received from ANASTASIA Zurita on Juan C Roe being received from [facility] ER  for routine progression of care.     Report consisted of patient’s Situation, Background, Assessment and Recommendations(SBAR).     Information from the following report(s) SBAR, ED Summary, MAR, Med Rec Status, and Cardiac Rhythm NSR  was reviewed. Opportunity for questions and clarification was provided.      Assessment completed upon patient’s arrival to unit and care assumed.     Patient received to room 403. Patient connected to monitor and assessment completed. Plan of care reviewed. Patient oriented to room and call light. Patient aware to use call light to communicate any chest pain or needs.

## 2025-02-19 NOTE — CARE COORDINATION
Case Management Assessment  Initial Evaluation    Date/Time of Evaluation: 2/19/2025 10:39 AM  Assessment Completed by: ELMA MARTINEZ    If patient is discharged prior to next notation, then this note serves as note for discharge by case management.    Patient Name: Juan C Roe                   YOB: 1955  Diagnosis:   Syncope and collapse [R55]                   Date / Time: 2/18/2025  9:46 AM    Patient Admission Status: Inpatient   Readmission Risk (Low < 19, Mod (19-27), High > 27): Readmission Risk Score: 5.4        Current PCP: Colt Garland MD  PCP verified by CM? (P) Yes (Dr Tye Diane with [facility])    Chart Reviewed: Yes      History Provided by: (P) Patient  Patient Orientation: (P) Alert and Oriented    Patient Cognition: (P) Alert    Hospitalization in the last 30 days (Readmission):  No    If yes, Readmission Assessment in CM Navigator will be completed.    Advance Directives:      Code Status: Full Code   Patient's Primary Decision Maker is: (P) Legal Next of Kin      Discharge Planning:    Patient lives with: (P) Alone Type of Home: (P) House  Primary Care Giver: (P) Self  Patient Support Systems include: (P) Parent   Current Financial resources: (P) Medicare, Other (Comment) (Medicare, [place] BCBS)  Current community resources: (P) None  Current services prior to admission: (P) None            Current DME:              Type of Home Care services:  None    ADLS  Prior functional level: (P) Independent in ADLs/IADLs  Current functional level: (P) Independent in ADLs/IADLs    PT AM-PAC:   /24  OT AM-PAC:   /24    Family can provide assistance at DC: (P) Yes  Would you like Case Management to discuss the discharge plan with any other family members/significant others, and if so, who? (P) Yes  Plans to Return to Present Housing: (P) Yes  Other Identified Issues/Barriers to RETURNING to current housing: pending   Potential Assistance needed at discharge: N/A            Potential  DME:  none   Patient expects to discharge to: (P) House  Plan for transportation at discharge: family/friend car      Financial    Payor: MEDICARE / Plan: MEDICARE PART A AND B / Product Type: *No Product type* /     Does insurance require precert for SNF: No    Potential assistance Purchasing Medications: (P) No  Meds-to-Beds request: Yes      [id number] - [place], [place] - [address] [phone] - F [phone]  [address] [address]  Phone: [phone] Fax: [phone]          Notes:    Factors facilitating achievement of predicted outcomes: Family support, Friend support, Motivated, Cooperative, and Pleasant    Barriers to discharge: pending medical treatment      Additional Case Management Notes: Chart reviewed, RN CM in room, patient sitting up in bed, alert and oriented, no family present at this time. Patient states he lives alone in 2 story condo, 1 step to enter and 12 steps to 2nd floor. Uses no AD for ambulation, No DME in the home, no HH, active . No children or siblings. Patient sees Dr Tye Diane with [facility] and has seen in the last 3 months. His street address is [address] [place] [address]. Denies having fall although the chart states he did have a fall, RN CM will continue to follow.        02/19/25 1025   Service Assessment   Patient Orientation Alert and Oriented   Cognition Alert   History Provided By Patient   Primary Caregiver Self   Accompanied By/Relationship no one at this time   Support Systems Parent   Patient's Healthcare Decision Maker is: Legal Next of Kin   PCP Verified by CM Yes  (Dr Tye Diane with [facility])   Last Visit to PCP Within last 3 months   Prior Functional Level Independent in ADLs/IADLs   Current Functional Level Independent in ADLs/IADLs   Can patient return to prior living arrangement Yes   Ability to make needs known: Good   Family able to assist with home care needs: Yes   Would you like for me to discuss the

## 2025-02-19 NOTE — PROGRESS NOTES
4 Eyes Skin Assessment     NAME:  Juan C Roe  YOB: 1955  MEDICAL RECORD NUMBER:  [id number]    The patient is being assessed for  Admission    I agree that at least one RN has performed a thorough Head to Toe Skin Assessment on the patient. ALL assessment sites listed below have been assessed.      Areas assessed by both nurses:    Head, Face, Ears, Shoulders, Back, Chest, Arms, Elbows, Hands, Sacrum. Buttock, Coccyx, Ischium, and Legs. Feet and Heels        Does the Patient have a Wound? No noted wound(s)       Rolando Prevention initiated by RN: Yes  Wound Care Orders initiated by RN: No    Pressure Injury (Stage 3,4, Unstageable, DTI, NWPT, and Complex wounds) if present, place Wound referral order by RN under : No    New Ostomies, if present place, Ostomy referral order under : No     Nurse 1 eSignature: Electronically signed by Marcos Carlson RN on 2/19/25 at 5:59 AM EST    **SHARE this note so that the co-signing nurse can place an eSignature**    Nurse 2 eSignature: {Esignature:[id number]}

## 2025-02-19 NOTE — PROGRESS NOTES
TRANSFER - OUT REPORT:    Verbal report given to ANASTASIA Rebollar on Juan C Roe  being transferred to [facility] for routine progression of patient care       Report consisted of patient's Situation, Background, Assessment and   Recommendations(SBAR).     Information from the following report(s) Nurse Handoff Report, Adult Overview, Intake/Output, MAR, and Recent Results was reviewed with the receiving nurse.           Lines:   Peripheral IV 02/18/25 Distal;Right Cephalic (Active)   Site Assessment Clean, dry & intact 02/19/25 0445   Line Status Flushed;Capped 02/19/25 0445   Line Care Connections checked and tightened 02/19/25 0445   Phlebitis Assessment No symptoms 02/19/25 0445   Infiltration Assessment 0 02/19/25 0445   Alcohol Cap Used Yes 02/19/25 0445   Dressing Status Clean, dry & intact 02/19/25 0445   Dressing Type Transparent 02/19/25 0445        Opportunity for questions and clarification was provided.      Patient transported with:  Tech

## 2025-02-20 PROBLEM — R00.2 PALPITATION: Status: ACTIVE | Noted: 2025-02-20

## 2025-02-20 PROBLEM — I26.99 PULMONARY EMBOLISM (HCC): Status: ACTIVE | Noted: 2025-02-20

## 2025-02-20 LAB
ANION GAP SERPL CALC-SCNC: 12 MMOL/L (ref 7–16)
BASOPHILS # BLD: 0.05 K/UL (ref 0–0.2)
BASOPHILS NFR BLD: 0.6 % (ref 0–2)
BUN SERPL-MCNC: 10 MG/DL (ref 8–23)
CALCIUM SERPL-MCNC: 8.3 MG/DL (ref 8.8–10.2)
CHLORIDE SERPL-SCNC: 104 MMOL/L (ref 98–107)
CO2 SERPL-SCNC: 24 MMOL/L (ref 20–29)
CREAT SERPL-MCNC: 0.75 MG/DL (ref 0.8–1.3)
DIFFERENTIAL METHOD BLD: ABNORMAL
EOSINOPHIL # BLD: 0.17 K/UL (ref 0–0.8)
EOSINOPHIL NFR BLD: 1.9 % (ref 0.5–7.8)
ERYTHROCYTE [DISTWIDTH] IN BLOOD BY AUTOMATED COUNT: 13.1 % (ref 11.9–14.6)
GLUCOSE SERPL-MCNC: 95 MG/DL (ref 70–99)
HCT VFR BLD AUTO: 42.3 % (ref 41.1–50.3)
HGB BLD-MCNC: 14.3 G/DL (ref 13.6–17.2)
IMM GRANULOCYTES # BLD AUTO: 0.02 K/UL (ref 0–0.5)
IMM GRANULOCYTES NFR BLD AUTO: 0.2 % (ref 0–5)
LYMPHOCYTES # BLD: 2.6 K/UL (ref 0.5–4.6)
LYMPHOCYTES NFR BLD: 29 % (ref 13–44)
MCH RBC QN AUTO: 29.9 PG (ref 26.1–32.9)
MCHC RBC AUTO-ENTMCNC: 33.8 G/DL (ref 31.4–35)
MCV RBC AUTO: 88.5 FL (ref 82–102)
MONOCYTES # BLD: 1.17 K/UL (ref 0.1–1.3)
MONOCYTES NFR BLD: 13 % (ref 4–12)
NEUTS SEG # BLD: 4.97 K/UL (ref 1.7–8.2)
NEUTS SEG NFR BLD: 55.3 % (ref 43–78)
NRBC # BLD: 0 K/UL (ref 0–0.2)
PLATELET # BLD AUTO: 165 K/UL (ref 150–450)
PMV BLD AUTO: 10.9 FL (ref 9.4–12.3)
POTASSIUM SERPL-SCNC: 3.8 MMOL/L (ref 3.5–5.1)
RBC # BLD AUTO: 4.78 M/UL (ref 4.23–5.6)
SODIUM SERPL-SCNC: 139 MMOL/L (ref 136–145)
TSH W FREE THYROID IF ABNORMAL: 0.91 UIU/ML (ref 0.27–4.2)
WBC # BLD AUTO: 9 K/UL (ref 4.3–11.1)

## 2025-02-20 PROCEDURE — 97530 THERAPEUTIC ACTIVITIES: CPT

## 2025-02-20 PROCEDURE — 85025 COMPLETE CBC W/AUTO DIFF WBC: CPT

## 2025-02-20 PROCEDURE — 1100000003 HC PRIVATE W/ TELEMETRY

## 2025-02-20 PROCEDURE — 36415 COLL VENOUS BLD VENIPUNCTURE: CPT

## 2025-02-20 PROCEDURE — 97161 PT EVAL LOW COMPLEX 20 MIN: CPT

## 2025-02-20 PROCEDURE — 6370000000 HC RX 637 (ALT 250 FOR IP): Performed by: FAMILY MEDICINE

## 2025-02-20 PROCEDURE — 80048 BASIC METABOLIC PNL TOTAL CA: CPT

## 2025-02-20 PROCEDURE — 6370000000 HC RX 637 (ALT 250 FOR IP): Performed by: INTERNAL MEDICINE

## 2025-02-20 PROCEDURE — 2500000003 HC RX 250 WO HCPCS: Performed by: INTERNAL MEDICINE

## 2025-02-20 PROCEDURE — 84443 ASSAY THYROID STIM HORMONE: CPT

## 2025-02-20 RX ORDER — VALSARTAN 80 MG/1
160 TABLET ORAL DAILY
Status: DISCONTINUED | OUTPATIENT
Start: 2025-02-20 | End: 2025-02-21 | Stop reason: HOSPADM

## 2025-02-20 RX ADMIN — FAMOTIDINE 10 MG: 20 TABLET, FILM COATED ORAL at 21:36

## 2025-02-20 RX ADMIN — FAMOTIDINE 10 MG: 20 TABLET, FILM COATED ORAL at 00:14

## 2025-02-20 RX ADMIN — VALSARTAN 160 MG: 160 TABLET, FILM COATED ORAL at 21:36

## 2025-02-20 RX ADMIN — APIXABAN 5 MG: 5 TABLET, FILM COATED ORAL at 21:36

## 2025-02-20 RX ADMIN — SODIUM CHLORIDE, PRESERVATIVE FREE 10 ML: 5 INJECTION INTRAVENOUS at 08:32

## 2025-02-20 RX ADMIN — SODIUM CHLORIDE, PRESERVATIVE FREE 10 ML: 5 INJECTION INTRAVENOUS at 21:37

## 2025-02-20 RX ADMIN — LEVOTHYROXINE SODIUM 88 MCG: 0.09 TABLET ORAL at 04:31

## 2025-02-20 RX ADMIN — AMLODIPINE BESYLATE 10 MG: 10 TABLET ORAL at 21:36

## 2025-02-20 RX ADMIN — METOPROLOL SUCCINATE 50 MG: 50 TABLET, EXTENDED RELEASE ORAL at 08:28

## 2025-02-20 RX ADMIN — APIXABAN 5 MG: 5 TABLET, FILM COATED ORAL at 08:28

## 2025-02-20 RX ADMIN — CHOLECALCIFEROL TAB 125 MCG (5000 UNIT) 5000 UNITS: 125 TAB at 12:02

## 2025-02-20 RX ADMIN — ATORVASTATIN CALCIUM 20 MG: 20 TABLET, FILM COATED ORAL at 21:36

## 2025-02-20 ASSESSMENT — PAIN SCALES - GENERAL: PAINLEVEL_OUTOF10: 0

## 2025-02-20 NOTE — PLAN OF CARE
Problem: Discharge Planning  Goal: Discharge to home or other facility with appropriate resources  Outcome: Progressing     Problem: Safety - Adult  Goal: Free from fall injury  Outcome: Progressing     Problem: Musculoskeletal - Adult  Goal: Return mobility to safest level of function  Outcome: Progressing     Problem: ABCDS Injury Assessment  Goal: Absence of physical injury  Outcome: Progressing

## 2025-02-20 NOTE — PROGRESS NOTES
Hospitalist Progress Note   Admit Date:  2025  9:46 AM   Name:  Juan C Roe   Age:  69 y.o.  Sex:  male  :  1955   MRN:  [id number]   Room:  [facility]/    Presenting/Chief Complaint: Loss of Consciousness and Shortness of Breath     Reason(s) for Admission: Syncope and collapse [R55]     Hospital Course:   Juan C Roe is a 69 y.o. male with medical history of PE, hypothyroidism, HTN, factor V Leyden who presented with a syncopal episode.  He came to the hospital to  his friend who was having outpatient surgery.  He had a pulse oximeter with him and his heart rate was in the 120s and started feeling lightheaded.  Nurse saw him and placed him in a wheelchair where he had a syncopal episode and fell to the floor.  Rapid response was called as his blood pressure was 84/57 he was taken to the ER and had a vomiting episode on route.  He denies any dyspnea or chest pain.    Of note, patient was admitted at [facility] - for bilateral PE.  TTE obtained at that time was unremarkable and patient was discharged on Eliquis.    In ED, WBC 14.4.  K3.3.  Lactic acid 4.3.Procalcitonin 0.07. UA unremarkable.  CXR shows no acute findings.  Carotid US shows no significant stenosis.She received sepsis bolus in the ED along with    Patient was admitted with syncope.       Subjective & 24hr Events:     He admitted feeling tachycardic, flushed with BP fluctuating for months without clear etiology. He had presyncopal symptoms in the past couple of months as well. Feels fine today. Some SOB with exertion which is not new and in fact improve since he was started on treatment for PE. No chest pain. No fever or chills.       Assessment & Plan:     Syncope  Most likely orthostatic vs dysrhythmia. HS-trop 11, EKG not c/w ACS.  TTE on 2025 with EF and diastolic function with no significant valvular abnormalities.  Monitor on telemetry  Orthostatic VS mildly positive on   Reduce Valsartan to 160mg qd  Check TSH,

## 2025-02-20 NOTE — CONSULTS
[facility] Cardiology Initial Cardiac Evaluation                 Date of  Admission: 2/18/2025  9:46 AM     Primary Care Physician: Colt Garland MD  Primary Cardiologist: CCC  Referring Physician: Dr Blake  Attending Physician: Dr Guerrero    CC: syncope, palpitations      Juan C Roe is a 69 y.o. male admitted for Syncope and collapse [R55].  He has a h/o of factor V Leiden, HLD, hypothyroidism, HTN, hearing loss w tinnitus s/p transtympanic steroid injections w dizziness and vagal responses per ENT notes, acute PE 1/27/25 treated w eliquis (did miss two doses recently).  1/28/25 TTE EF 55-65%, normal diastolic function. Presented to the ER 2/19/25 when found on an elevated w SOB, pale and shaky, pt placed in a wheelchair, had syncope w BP 84/57, vomiting.     In ER , K 3.8, cr .75, lactic acid 3.3, initial pH 7.4 w pCO2 26, trop 11, WBC 14, hgb 14, platelets 202, CXR no acute process, carotid ultrasound LICA upper limits of normal, EKG  ST w rate 105,  BP down to 67/48. Hydrated, cont on eliquis, started on antibiotics, cont on norvasc, diovan and toprol.  After hydration /91.     Pt has done poorly for 1-2 months.  He has had worse tinnitus.  Has undergone three transtympanic steroid injection and po steroids.  He had been on exforge 10/160 for many years and for unknown reasons his PCP increased to 10/320 about two months ago, has been on toprol at current dose many years.      For the past month he states he has been \"out of sorts\" with facial flushing, palpitations and erratic BP.  He usually goes to sleep at 3 AM and then wakes late morning but on Tuesday  had to take his friend to his procedure at 8 AM so hadn't slept at all the night before. That morning he was taking a shower when he had sudden onset of feeling light headed, -145. He took deep breaths but still felt bad but came to the hospital.  He got some coffee and a muffin to revive him, had to have sudden diarrhea, went to the

## 2025-02-20 NOTE — CARE COORDINATION
Pt admitted on 2/18/25 for syncope and collapse. Is on RA. Previous CM completed CMA:  Patient states he lives alone in 2 story condo, 1 step to enter and 12 steps to 2nd floor. Uses no AD for ambulation, No DME in the home, no HH, active . Has an established PCP and [place] Medicare.  PT/OT consulted. Will await therapies recs to discern dcp.

## 2025-02-20 NOTE — PROGRESS NOTES
ACUTE PHYSICAL THERAPY GOALS:   (Developed with and agreed upon by patient and/or caregiver.)  Pt will perform bed mobility with Keyla in 7 therapy sessions.  Pt will perform sit-to-stand/ stand-to-sit transfers SBA in 7 therapy sessions.  Pt will ambulate 250 ft SBA with use of LRAD/no device and breaks as needed in 7 therapy sessions.    All PT goals met on 2/20/2025.     PHYSICAL THERAPY Initial Assessment, Discharge, and PM  (Link to Caseload Tracking: PT Visit Days : 1  Acknowledge Orders  Time In/Out  PT Charge Capture  Rehab Caseload Tracker    Juan C Roe is a 69 y.o. male   PRIMARY DIAGNOSIS: Syncope and collapse  Syncope and collapse [R55]       Reason for Referral: Other abnormalities of gait and mobility (R26.89)  Inpatient: Payor: MEDICARE / Plan: MEDICARE PART A AND B / Product Type: *No Product type* /     ASSESSMENT:     REHAB RECOMMENDATIONS:   Recommendation to date pending progress:  Setting:  No further skilled physical therapy after discharge from hospital    Equipment:    None     ASSESSMENT:  Mr. Roe is a 69 y.o. male presenting to PT following a hospitalization due to syncope and collapse. At time of initial evaluation, pt presents at approximate baseline with no major deficits limiting overall functional mobility that warrant safety concerns. Orthostatic BP was taken in supine, seated, and standing and found to be negative with pt denying symptoms of dizziness with all positional changes (see vitals tab). Today, pt performed all mobility and transfers with independence/supervision including ambulation across 500' including 1x flight of stairs. During ambulation, pt presents with no major gait abnormalities, but states that he feels \"glitchy\" while ambulating. At end of session, visual tracking was assessed, with saccadic B eye movement noted. Additionally, during B rolling in bed, a subtle up-beating nystagmus was noted. R made aware of these findings. Pt is not an appropriate candidate

## 2025-02-21 ENCOUNTER — APPOINTMENT (OUTPATIENT)
Dept: NON INVASIVE DIAGNOSTICS | Age: 70
DRG: 312 | End: 2025-02-21
Attending: INTERNAL MEDICINE
Payer: MEDICARE

## 2025-02-21 VITALS
HEIGHT: 71 IN | DIASTOLIC BLOOD PRESSURE: 89 MMHG | BODY MASS INDEX: 26.88 KG/M2 | WEIGHT: 192 LBS | SYSTOLIC BLOOD PRESSURE: 118 MMHG | OXYGEN SATURATION: 97 % | HEART RATE: 71 BPM | RESPIRATION RATE: 19 BRPM | TEMPERATURE: 97.1 F

## 2025-02-21 PROBLEM — I95.1 ORTHOSTATIC HYPOTENSION: Status: ACTIVE | Noted: 2025-02-21

## 2025-02-21 LAB
ANION GAP SERPL CALC-SCNC: 14 MMOL/L (ref 7–16)
BASOPHILS # BLD: 0.05 K/UL (ref 0–0.2)
BASOPHILS NFR BLD: 0.6 % (ref 0–2)
BUN SERPL-MCNC: 13 MG/DL (ref 8–23)
CALCIUM SERPL-MCNC: 8.7 MG/DL (ref 8.8–10.2)
CHLORIDE SERPL-SCNC: 103 MMOL/L (ref 98–107)
CO2 SERPL-SCNC: 23 MMOL/L (ref 20–29)
CORTIS AM PEAK SERPL-MCNC: 18.5 UG/DL (ref 4.8–19.5)
CREAT SERPL-MCNC: 0.79 MG/DL (ref 0.8–1.3)
DIFFERENTIAL METHOD BLD: ABNORMAL
EOSINOPHIL # BLD: 0.2 K/UL (ref 0–0.8)
EOSINOPHIL NFR BLD: 2.2 % (ref 0.5–7.8)
ERYTHROCYTE [DISTWIDTH] IN BLOOD BY AUTOMATED COUNT: 12.9 % (ref 11.9–14.6)
GLUCOSE SERPL-MCNC: 106 MG/DL (ref 70–99)
HCT VFR BLD AUTO: 44.1 % (ref 41.1–50.3)
HGB BLD-MCNC: 15.4 G/DL (ref 13.6–17.2)
IMM GRANULOCYTES # BLD AUTO: 0.03 K/UL (ref 0–0.5)
IMM GRANULOCYTES NFR BLD AUTO: 0.3 % (ref 0–5)
LYMPHOCYTES # BLD: 2.94 K/UL (ref 0.5–4.6)
LYMPHOCYTES NFR BLD: 32.5 % (ref 13–44)
MCH RBC QN AUTO: 30.3 PG (ref 26.1–32.9)
MCHC RBC AUTO-ENTMCNC: 34.9 G/DL (ref 31.4–35)
MCV RBC AUTO: 86.8 FL (ref 82–102)
MONOCYTES # BLD: 1.13 K/UL (ref 0.1–1.3)
MONOCYTES NFR BLD: 12.5 % (ref 4–12)
NEUTS SEG # BLD: 4.69 K/UL (ref 1.7–8.2)
NEUTS SEG NFR BLD: 51.9 % (ref 43–78)
NRBC # BLD: 0 K/UL (ref 0–0.2)
PLATELET # BLD AUTO: 170 K/UL (ref 150–450)
PMV BLD AUTO: 10.7 FL (ref 9.4–12.3)
POTASSIUM SERPL-SCNC: 3.7 MMOL/L (ref 3.5–5.1)
RBC # BLD AUTO: 5.08 M/UL (ref 4.23–5.6)
SODIUM SERPL-SCNC: 139 MMOL/L (ref 136–145)
WBC # BLD AUTO: 9 K/UL (ref 4.3–11.1)

## 2025-02-21 PROCEDURE — 6370000000 HC RX 637 (ALT 250 FOR IP): Performed by: INTERNAL MEDICINE

## 2025-02-21 PROCEDURE — 36415 COLL VENOUS BLD VENIPUNCTURE: CPT

## 2025-02-21 PROCEDURE — 82533 TOTAL CORTISOL: CPT

## 2025-02-21 PROCEDURE — 85025 COMPLETE CBC W/AUTO DIFF WBC: CPT

## 2025-02-21 PROCEDURE — 97165 OT EVAL LOW COMPLEX 30 MIN: CPT

## 2025-02-21 PROCEDURE — 2500000003 HC RX 250 WO HCPCS: Performed by: INTERNAL MEDICINE

## 2025-02-21 PROCEDURE — 97535 SELF CARE MNGMENT TRAINING: CPT

## 2025-02-21 PROCEDURE — 80048 BASIC METABOLIC PNL TOTAL CA: CPT

## 2025-02-21 PROCEDURE — 99232 SBSQ HOSP IP/OBS MODERATE 35: CPT | Performed by: INTERNAL MEDICINE

## 2025-02-21 RX ORDER — VALSARTAN 160 MG/1
160 TABLET ORAL DAILY
Qty: 90 TABLET | Refills: 0 | Status: SHIPPED | OUTPATIENT
Start: 2025-02-21 | End: 2025-02-21

## 2025-02-21 RX ORDER — METOPROLOL SUCCINATE 25 MG/1
25 TABLET, EXTENDED RELEASE ORAL DAILY
Status: DISCONTINUED | OUTPATIENT
Start: 2025-02-22 | End: 2025-02-21 | Stop reason: HOSPADM

## 2025-02-21 RX ORDER — VALSARTAN 160 MG/1
160 TABLET ORAL DAILY
Qty: 90 TABLET | Refills: 0 | Status: SHIPPED | OUTPATIENT
Start: 2025-02-21

## 2025-02-21 RX ADMIN — LEVOTHYROXINE SODIUM 88 MCG: 0.09 TABLET ORAL at 06:32

## 2025-02-21 RX ADMIN — METOPROLOL SUCCINATE 50 MG: 50 TABLET, EXTENDED RELEASE ORAL at 08:20

## 2025-02-21 RX ADMIN — SODIUM CHLORIDE, PRESERVATIVE FREE 10 ML: 5 INJECTION INTRAVENOUS at 08:21

## 2025-02-21 RX ADMIN — APIXABAN 5 MG: 5 TABLET, FILM COATED ORAL at 08:20

## 2025-02-21 ASSESSMENT — PAIN SCALES - GENERAL
PAINLEVEL_OUTOF10: 0
PAINLEVEL_OUTOF10: 0

## 2025-02-21 NOTE — DISCHARGE SUMMARY
Signed by: 1/27/2025 7:15 PM: Maurisio Leon MD    XR CHEST 1 VIEW    Result Date: 1/27/2025  No acute disease of lateral only view chest radiograph Signed by: 1/27/2025 6:28 PM: GRETEL Arredondo MD       Labs: Results:       BMP, Mg, Phos Recent Labs     02/19/25  1659 02/20/25 0433 02/21/25  0315   NA  --  139 139   K 4.5 3.8 3.7   CL  --  104 103   CO2  --  24 23   ANIONGAP  --  12 14   BUN  --  10 13   CREATININE  --  0.75* 0.79*   LABGLOM  --  >90 >90   CALCIUM  --  8.3* 8.7*   GLUCOSE  --  95 106*      CBC Recent Labs     02/20/25 0433 02/21/25 0315   WBC 9.0 9.0   RBC 4.78 5.08   HGB 14.3 15.4   HCT 42.3 44.1   MCV 88.5 86.8   MCH 29.9 30.3   MCHC 33.8 34.9   RDW 13.1 12.9    170   MPV 10.9 10.7   NRBC 0.00 0.00   LYMPHOPCT 29.0 32.5   MONOPCT 13.0* 12.5*   BASOPCT 0.6 0.6   IMMGRAN 0.2 0.3   LYMPHSABS 2.60 2.94   EOSABS 0.17 0.20   MONOSABS 1.17 1.13   BASOSABS 0.05 0.05   ABSIMMGRAN 0.02 0.03      LFT No results for input(s): \"BILITOT\", \"BILIDIR\", \"ALKPHOS\", \"AST\", \"ALT\", \"PROTEIN\", \"ALBUMIN\", \"GLOB\" in the last 72 hours.   Cardiac  No results found for: \"NTPROBNP\", \"TROPHS\"   Coags No results found for: \"PROTIME\", \"INR\", \"APTT\"   A1c No results found for: \"LABA1C\", \"EAG\"   Lipids No results found for: \"CHOL\", \"HDL\", \"CHOLHDLRATIO\", \"TRIG\"   Thyroid  Lab Results   Component Value Date/Time    TSHELE 0.91 02/20/2025 04:33 AM        Most Recent UA Lab Results   Component Value Date/Time    COLORU YELLOW/STRAW 02/18/2025 01:28 PM    APPEARANCE CLEAR 02/18/2025 01:28 PM    PROTEINU Negative 02/18/2025 01:28 PM    GLUCOSEU Negative 02/18/2025 01:28 PM    KETUA Negative 02/18/2025 01:28 PM    BILIRUBINUR Negative 02/18/2025 01:28 PM    BLOODU SMALL 02/18/2025 01:28 PM    UROBILINOGEN 0.2 02/18/2025 01:28 PM    NITRU Negative 02/18/2025 01:28 PM    LEUKOCYTESUR Negative 02/18/2025 01:28 PM    WBCUA 0-4 02/18/2025 01:28 PM    RBCUA 10-20 02/18/2025 01:28 PM    BACTERIA Negative 02/18/2025 01:28 PM

## 2025-02-21 NOTE — DISCHARGE INSTRUCTIONS
Please call your cardiologist at [facility] for a 30 day Holter monitor. If you prefer to have this done with [facility], please call [phone] ([address] [place], [place] [address]).

## 2025-02-21 NOTE — PROGRESS NOTES
[facility] CARDIOLOGY PROGRESS NOTE           2/21/2025 10:24 AM    Admit Date: 2/18/2025         Subjective: Got up to use the restroom today and again felt somewhat lightheaded.  He denies chest pain or palpitations.  Echocardiogram from last month was reviewed and is done at [facility].    ROS:  Cardiovascular:  As noted above    Objective:      Vitals:    02/20/25 1958 02/20/25 2347 02/21/25 0421 02/21/25 0727   BP: 120/86 105/73 121/82 (!) 126/92   Pulse:    71   Resp: 19 19 19    Temp: 98.2 °F (36.8 °C) 98.4 °F (36.9 °C) 98.4 °F (36.9 °C) 98.3 °F (36.8 °C)   TempSrc: Oral Oral Oral Oral   SpO2: 97% 94% 96% 97%   Weight:       Height:             Physical Exam:  General: Well Developed, Well Nourished, No Acute Distress, Alert & Oriented x 3, Appropriate mood  Neck: supple, no JVD  Heart: S1S2 with RRR without murmurs or gallops  Lungs: Clear throughout auscultation bilaterally without adventitious sounds  Abd: soft, nontender, nondistended, with good bowel sounds  Ext: no edema bilaterally  Skin: warm and dry      Data Review:   Recent Labs     02/20/25  0433 02/21/25  0315    139   K 3.8 3.7   BUN 10 13   WBC 9.0 9.0   HGB 14.3 15.4   HCT 42.3 44.1    170       No results for input(s): \"TNIPOC\" in the last 72 hours.    Invalid input(s): \"TROIQ\"        Assessment/Plan:     Principal Problem:    Syncope and collapse  Active Problems:    Pulmonary embolism (HCC)    Heart palpitations    Essential hypertension    Orthostatic hypotension  Resolved Problems:    * No resolved hospital problems. *    A/P  1) Syncope - likely vasovagal.  Plan on decreasing home HTN meds to be titrated as outpatient.  Echo shows no significant structural issues.  Plan on outpt tele -30d can be ordered through our office or if he wants to go to [facility] he can arrange that himself. Would discharge on metop 25 mg XL daily and diovan 160 mg daily would not continue

## 2025-02-21 NOTE — PROGRESS NOTES
ACUTE OCCUPATIONAL THERAPY GOALS:   (Developed with and agreed upon by patient and/or caregiver.)  1.  Pt will complete functional mobility for ADLs independently    Goals met    OCCUPATIONAL THERAPY Initial Assessment, Daily Note, and Discharge       OT Visit Days: 1  Acknowledge Orders  Time  OT Charge Capture  Rehab Caseload Tracker      Juan C Roe is a 69 y.o. male   PRIMARY DIAGNOSIS: Syncope and collapse  Syncope and collapse [R55]       Reason for Referral: Generalized Muscle Weakness (M62.81)  Inpatient: Payor: MEDICARE / Plan: MEDICARE PART A AND B / Product Type: *No Product type* /     ASSESSMENT:     REHAB RECOMMENDATIONS:   Recommendation to date pending progress:  Setting:  No further skilled occupational therapy after discharge from hospital    Equipment:    None     ASSESSMENT:  Mr. Roe was admitted with syncope and collapse. Pt remains fully independent with ADLs and with functional mobility for ADLs. Pt with slightly elevated HR (76 at rest to 108 following activity), however tolerated session well w/o c/o fatigue. Pt did not demonstrate any fxnal deficits and does not require further skilled OT services at this time, no d/c needs.      [facility] AM-PAC™ “6 Clicks” Daily Activity Inpatient Short Form:    AM-PAC Daily Activity - Inpatient   How much help is needed for putting on and taking off regular lower body clothing?: None  How much help is needed for bathing (which includes washing, rinsing, drying)?: None  How much help is needed for toileting (which includes using toilet, bedpan, or urinal)?: None  How much help is needed for putting on and taking off regular upper body clothing?: None  How much help is needed for taking care of personal grooming?: None  How much help for eating meals?: None  AM-[facility] Inpatient Daily Activity Raw Score: 24  AM-PAC Inpatient ADL T-Scale Score : 57.54  ADL Inpatient CMS 0-100% Score: 0  ADL Inpatient CMS G-Code Modifier : CH           SUBJECTIVE:

## 2025-02-21 NOTE — PROGRESS NOTES
Spiritual Health History and Assessment/Progress Note  [facility]    (P) Spiritual/Emotional Needs, Rapid Response,  ,      Name: Juan C Roe MRN: [id number]    Age: 69 y.o.     Sex: male   Language: English   Taoist: Nondenominational   Syncope and collapse     Date: 2/21/2025            Total Time Calculated: (P) 30 min              Spiritual Assessment began in [facility] 4 TELEMETRY        Referral/Consult From: (P) Rounding   Encounter Overview/Reason: (P) Spiritual/Emotional Needs  Service Provided For: (P) Patient    Yudith, Belief, Meaning:   Patient identifies as spiritual  Family/Friends No family/friends present      Importance and Influence:  Patient has no beliefs influential to healthcare decision-making identified during this visit  Family/Friends No family/friends present    Community:  Patient feels well-supported. Support system includes: Parent/s and Friends  Family/Friends No family/friends present    Assessment and Plan of Care:     Patient Interventions include: Facilitated expression of thoughts and feelings and Affirmed coping skills/support systems  Family/Friends Interventions include: No family/friends present    Patient Plan of Care: Spiritual Care available upon further referral  Family/Friends Plan of Care: No family/friends present    Electronically signed by RUDOLPH CACERES on 2/21/2025 at 10:19 AM

## 2025-02-21 NOTE — PLAN OF CARE
Problem: Discharge Planning  Goal: Discharge to home or other facility with appropriate resources  2/21/2025 0046 by Jorge Goodwin RN  Outcome: Progressing  Flowsheets (Taken 2/20/2025 2030)  Discharge to home or other facility with appropriate resources:   Identify barriers to discharge with patient and caregiver   Arrange for needed discharge resources and transportation as appropriate   Identify discharge learning needs (meds, wound care, etc)  2/20/2025 1738 by Waleska Durant RN  Outcome: Progressing     Problem: Safety - Adult  Goal: Free from fall injury  2/21/2025 0046 by Jorge Goodwin RN  Outcome: Progressing  Flowsheets (Taken 2/20/2025 2030)  Free From Fall Injury: Instruct family/caregiver on patient safety  2/20/2025 1738 by Waleska Durant RN  Outcome: Progressing     Problem: Musculoskeletal - Adult  Goal: Return mobility to safest level of function  2/21/2025 0046 by Jorge Goodwin RN  Outcome: Progressing  Flowsheets (Taken 2/20/2025 2030)  Return Mobility to Safest Level of Function:   Assess patient stability and activity tolerance for standing, transferring and ambulating with or without assistive devices   Assist with transfers and ambulation using safe patient handling equipment as needed  2/20/2025 1738 by Waleska Durant RN  Outcome: Progressing     Problem: ABCDS Injury Assessment  Goal: Absence of physical injury  2/21/2025 0046 by Jorge Goodwin RN  Outcome: Progressing  Flowsheets (Taken 2/20/2025 2030)  Absence of Physical Injury: Implement safety measures based on patient assessment  2/20/2025 1738 by Waleska Durant RN  Outcome: Progressing

## 2025-02-21 NOTE — CARE COORDINATION
CM following. PT/OT consulted and recommended no needs post discharge. No discharge needs identified by CM, will monitor.    LOS-2 days  GMLOS-2.3 days    1157-Discharge order is in. Pt is discharging home now in stable condition. No discharge need identified by CM. Tx goals met.

## 2025-02-23 LAB
BACTERIA SPEC CULT: NORMAL
BACTERIA SPEC CULT: NORMAL
SERVICE CMNT-IMP: NORMAL
SERVICE CMNT-IMP: NORMAL

## 2025-02-24 LAB
COLLECT DURATION TIME UR: ABNORMAL HR
METANEPH 24H UR-MRATE: 51 UG/24 HR (ref 58–276)
METANEPHS 24H UR-MCNC: 17 UG/L
NORMETANEPHRINE 24H UR-MCNC: 178 UG/L
NORMETANEPHRINE 24H UR-MRATE: 534 UG/24 HR (ref 156–729)
SPECIMEN VOL ?TM UR: 3000 ML

## 2025-02-24 NOTE — PROGRESS NOTES
Physician Progress Note      PATIENT:               ANASTASIA PIEDRA  CSN #:                  [id number]  :                       1955  ADMIT DATE:       2025 9:46 AM  DISCH DATE:        2025 1:00 PM  RESPONDING  PROVIDER #:        Nilda Blake DO          QUERY TEXT:    Patient admitted with orthostatic hypotension. Noted documentation of sepsis   in H&P. In order to support the diagnosis of sepsis, please include additional   clinical indicators in your documentation.  Or please document if the   diagnosis of sepsis has been ruled out after further study    The medical record reflects the following:  Risk Factors:69 y.o. male with medical history of PE, hypothyroidism, HTN,   factor V Leyden who presented with a syncopal episode.  Clinical Indicators: H&P \"Syncopal episode: Multifactorial sec to tachycardia,   vagal response, sepsis\"   progress note \"In ED, WBC 14.4.  K3.3.  Lactic acid 4.3. Procalcitonin   0.07. UA unremarkable.  CXR shows no acute findings.\"  WBC 14.4, LA 4.3  Treatment: IVF's, Rocephin- Per MAR: (Intra-Abdominal Infection)  Options provided:  -- Sepsis was ruled out after study  -- Sepsis present as evidenced by, Please document evidence.  -- Other - I will add my own diagnosis  -- Disagree - Not applicable / Not valid  -- Disagree - Clinically unable to determine / Unknown  -- Refer to Clinical Documentation Reviewer    PROVIDER RESPONSE TEXT:    Sepsis was ruled out after study.    Query created by: Keturah Diaz on 2025 8:35 AM      Electronically signed by:  Nilda Blake DO 2025 8:41 AM

## 2025-02-28 LAB
COLLECT DURATION TIME UR: NORMAL HR
DOPAMINE UR-MCNC: NORMAL UG/L
EPINEPH UR-MCNC: NORMAL UG/L
NOREPINEPH UR-MCNC: NORMAL UG/L
SPECIMEN VOL ?TM UR: 3000 ML

## 2025-03-04 ENCOUNTER — HOSPITAL ENCOUNTER (EMERGENCY)
Age: 70
Discharge: HOME | End: 2025-03-04
Attending: EMERGENCY MEDICINE
Payer: MEDICARE

## 2025-03-04 ENCOUNTER — APPOINTMENT (OUTPATIENT)
Dept: CT IMAGING | Age: 70
End: 2025-03-04
Payer: MEDICARE

## 2025-03-04 ENCOUNTER — APPOINTMENT (OUTPATIENT)
Dept: GENERAL RADIOLOGY | Age: 70
End: 2025-03-04
Payer: MEDICARE

## 2025-03-04 VITALS
RESPIRATION RATE: 14 BRPM | OXYGEN SATURATION: 96 % | SYSTOLIC BLOOD PRESSURE: 143 MMHG | HEART RATE: 64 BPM | TEMPERATURE: 97.8 F | DIASTOLIC BLOOD PRESSURE: 100 MMHG

## 2025-03-04 DIAGNOSIS — R55 NEAR SYNCOPE: Primary | ICD-10-CM

## 2025-03-04 LAB
ALBUMIN SERPL-MCNC: 3.3 G/DL (ref 3.2–4.6)
ALBUMIN/GLOB SERPL: 0.9 (ref 1–1.9)
ALP SERPL-CCNC: 80 U/L (ref 40–129)
ALT SERPL-CCNC: 36 U/L (ref 8–55)
ANION GAP SERPL CALC-SCNC: 12 MMOL/L (ref 7–16)
AST SERPL-CCNC: 28 U/L (ref 15–37)
BASOPHILS # BLD: 0.05 K/UL (ref 0–0.2)
BASOPHILS NFR BLD: 0.5 % (ref 0–2)
BILIRUB SERPL-MCNC: 1 MG/DL (ref 0–1.2)
BUN SERPL-MCNC: 11 MG/DL (ref 8–23)
CALCIUM SERPL-MCNC: 9 MG/DL (ref 8.8–10.2)
CHLORIDE SERPL-SCNC: 104 MMOL/L (ref 98–107)
CO2 SERPL-SCNC: 23 MMOL/L (ref 20–29)
CREAT SERPL-MCNC: 0.92 MG/DL (ref 0.8–1.3)
DIFFERENTIAL METHOD BLD: ABNORMAL
EOSINOPHIL # BLD: 0.18 K/UL (ref 0–0.8)
EOSINOPHIL NFR BLD: 1.8 % (ref 0.5–7.8)
ERYTHROCYTE [DISTWIDTH] IN BLOOD BY AUTOMATED COUNT: 13.2 % (ref 11.9–14.6)
GLOBULIN SER CALC-MCNC: 3.5 G/DL (ref 2.3–3.5)
GLUCOSE SERPL-MCNC: 120 MG/DL (ref 70–99)
HCT VFR BLD AUTO: 45 % (ref 41.1–50.3)
HGB BLD-MCNC: 15.9 G/DL (ref 13.6–17.2)
IMM GRANULOCYTES # BLD AUTO: 0.03 K/UL (ref 0–0.5)
IMM GRANULOCYTES NFR BLD AUTO: 0.3 % (ref 0–5)
LYMPHOCYTES # BLD: 3.14 K/UL (ref 0.5–4.6)
LYMPHOCYTES NFR BLD: 30.8 % (ref 13–44)
MCH RBC QN AUTO: 30.5 PG (ref 26.1–32.9)
MCHC RBC AUTO-ENTMCNC: 35.3 G/DL (ref 31.4–35)
MCV RBC AUTO: 86.4 FL (ref 82–102)
MONOCYTES # BLD: 1.16 K/UL (ref 0.1–1.3)
MONOCYTES NFR BLD: 11.4 % (ref 4–12)
NEUTS SEG # BLD: 5.65 K/UL (ref 1.7–8.2)
NEUTS SEG NFR BLD: 55.2 % (ref 43–78)
NRBC # BLD: 0 K/UL (ref 0–0.2)
PLATELET # BLD AUTO: 216 K/UL (ref 150–450)
PMV BLD AUTO: 10.6 FL (ref 9.4–12.3)
POTASSIUM SERPL-SCNC: 3.8 MMOL/L (ref 3.5–5.1)
PROT SERPL-MCNC: 6.9 G/DL (ref 6.3–8.2)
RBC # BLD AUTO: 5.21 M/UL (ref 4.23–5.6)
SODIUM SERPL-SCNC: 139 MMOL/L (ref 136–145)
TROPONIN T SERPL HS-MCNC: 8 NG/L (ref 0–22)
WBC # BLD AUTO: 10.2 K/UL (ref 4.3–11.1)

## 2025-03-04 PROCEDURE — 71045 X-RAY EXAM CHEST 1 VIEW: CPT

## 2025-03-04 PROCEDURE — 80053 COMPREHEN METABOLIC PANEL: CPT

## 2025-03-04 PROCEDURE — 93005 ELECTROCARDIOGRAM TRACING: CPT | Performed by: EMERGENCY MEDICINE

## 2025-03-04 PROCEDURE — 70450 CT HEAD/BRAIN W/O DYE: CPT

## 2025-03-04 PROCEDURE — 85025 COMPLETE CBC W/AUTO DIFF WBC: CPT

## 2025-03-04 PROCEDURE — 99285 EMERGENCY DEPT VISIT HI MDM: CPT

## 2025-03-04 PROCEDURE — 84484 ASSAY OF TROPONIN QUANT: CPT

## 2025-03-04 ASSESSMENT — LIFESTYLE VARIABLES
HOW MANY STANDARD DRINKS CONTAINING ALCOHOL DO YOU HAVE ON A TYPICAL DAY: PATIENT DOES NOT DRINK
HOW OFTEN DO YOU HAVE A DRINK CONTAINING ALCOHOL: NEVER

## 2025-03-04 NOTE — ED TRIAGE NOTES
Pt arrives via EMS from home.  Pt was just admitted for 4 days for similar symptoms but last time lost consciousness.  Pt reports dizziness but denies LOC.  Pt has NSR, /98, HR 60s, 02 98 RA, .  AxOx4.

## 2025-03-04 NOTE — ED PROVIDER NOTES
Emergency Department Provider Note       PCP: Tye Diane MD   Age: 69 y.o.   Sex: male     DISPOSITION Decision To Discharge 03/04/2025 06:51:55 PM    ICD-10-CM    1. Near syncope  R55           Medical Decision Making     Patient is a 69-year-old male who presents via EMS after having dizziness that lasted approximately 1 minute which she felt that he was going to pass out.  Patient denies any focal neurological weakness denies any slurred speech or weakness of her upper or lower extremities denies any facial droop.  Patient denied any associated chest pain or palpitations.  Patient was admitted 2/18/2025 through 2/21/2025 for lightheadedness and syncope and it was thought to be secondary to orthostatic hypotension/vasovagal response.  Of note patient was admitted to [facility] 1/27/2025 for bilateral PE and discharged on Eliquis.  Patient has no chest pain chest pressure    Differential diagnosis includes but is not limited to dizziness, near-syncope, SOPHIA    Patient's physical exam is as stated.    Patient is laboratory testing unremarkable troponin negative which is reassuring.  Patient had no associated chest pain or palpitations.  Patient CT head per my independent interpretation is negative for intracranial hemorrhage.  We will DC home as patient feels great vital signs are stable with a blood pressure 135/97 temperature 97.9 pulse of 62 respiratory rate of 13 with O2 sat of 97% on room air.  Patient has an appointment with his primary care doctor tomorrow which she has been encouraged to keep.     1 or more acute illnesses that pose a threat to life or bodily function.   Shared medical decision making was utilized in creating the patients health plan today.  I independently ordered and reviewed each unique test.         ED cardiac monitoring rhythm strip was ordered and interpreted:  sinus rhythm, no evidence of an arrhythmia  ST Segments:Normal ST segments - NO STEMI   Rate: 63  I interpreted the CT

## 2025-03-05 LAB
EKG ATRIAL RATE: 62 BPM
EKG DIAGNOSIS: NORMAL
EKG P AXIS: 25 DEGREES
EKG P-R INTERVAL: 127 MS
EKG Q-T INTERVAL: 368 MS
EKG QRS DURATION: 90 MS
EKG QTC CALCULATION (BAZETT): 377 MS
EKG R AXIS: 41 DEGREES
EKG T AXIS: 61 DEGREES
EKG VENTRICULAR RATE: 63 BPM

## 2025-03-05 PROCEDURE — 93010 ELECTROCARDIOGRAM REPORT: CPT | Performed by: INTERNAL MEDICINE

## 2025-03-26 ENCOUNTER — HOSPITAL ENCOUNTER (EMERGENCY)
Age: 70
Discharge: HOME | End: 2025-03-26
Attending: EMERGENCY MEDICINE | Admitting: EMERGENCY MEDICINE
Payer: MEDICARE

## 2025-03-26 ENCOUNTER — APPOINTMENT (OUTPATIENT)
Dept: CT IMAGING | Age: 70
End: 2025-03-26
Payer: MEDICARE

## 2025-03-26 VITALS
HEIGHT: 71 IN | HEART RATE: 73 BPM | TEMPERATURE: 97.7 F | OXYGEN SATURATION: 96 % | RESPIRATION RATE: 14 BRPM | WEIGHT: 190 LBS | DIASTOLIC BLOOD PRESSURE: 96 MMHG | BODY MASS INDEX: 26.6 KG/M2 | SYSTOLIC BLOOD PRESSURE: 141 MMHG

## 2025-03-26 DIAGNOSIS — R07.9 CHEST PAIN, UNSPECIFIED TYPE: Primary | ICD-10-CM

## 2025-03-26 DIAGNOSIS — R06.00 DYSPNEA AND RESPIRATORY ABNORMALITIES: ICD-10-CM

## 2025-03-26 DIAGNOSIS — R06.89 DYSPNEA AND RESPIRATORY ABNORMALITIES: ICD-10-CM

## 2025-03-26 LAB
ALBUMIN SERPL-MCNC: 3.6 G/DL (ref 3.2–4.6)
ALBUMIN/GLOB SERPL: 1 (ref 1–1.9)
ALP SERPL-CCNC: 83 U/L (ref 40–129)
ALT SERPL-CCNC: 42 U/L (ref 8–55)
ANION GAP SERPL CALC-SCNC: 15 MMOL/L (ref 7–16)
AST SERPL-CCNC: 35 U/L (ref 15–37)
BASOPHILS # BLD: 0.08 K/UL (ref 0–0.2)
BASOPHILS NFR BLD: 0.8 % (ref 0–2)
BILIRUB SERPL-MCNC: 0.9 MG/DL (ref 0–1.2)
BUN SERPL-MCNC: 11 MG/DL (ref 8–23)
CALCIUM SERPL-MCNC: 9.2 MG/DL (ref 8.8–10.2)
CHLORIDE SERPL-SCNC: 101 MMOL/L (ref 98–107)
CO2 SERPL-SCNC: 22 MMOL/L (ref 20–29)
CREAT SERPL-MCNC: 0.95 MG/DL (ref 0.8–1.3)
DIFFERENTIAL METHOD BLD: ABNORMAL
EKG ATRIAL RATE: 76 BPM
EKG DIAGNOSIS: NORMAL
EKG P AXIS: 13 DEGREES
EKG P-R INTERVAL: 120 MS
EKG Q-T INTERVAL: 382 MS
EKG QRS DURATION: 74 MS
EKG QTC CALCULATION (BAZETT): 429 MS
EKG R AXIS: 51 DEGREES
EKG T AXIS: 72 DEGREES
EKG VENTRICULAR RATE: 76 BPM
EOSINOPHIL # BLD: 0.21 K/UL (ref 0–0.8)
EOSINOPHIL NFR BLD: 2 % (ref 0.5–7.8)
ERYTHROCYTE [DISTWIDTH] IN BLOOD BY AUTOMATED COUNT: 13.1 % (ref 11.9–14.6)
GLOBULIN SER CALC-MCNC: 3.7 G/DL (ref 2.3–3.5)
GLUCOSE SERPL-MCNC: 105 MG/DL (ref 70–99)
HCT VFR BLD AUTO: 46.5 % (ref 41.1–50.3)
HGB BLD-MCNC: 16.7 G/DL (ref 13.6–17.2)
IMM GRANULOCYTES # BLD AUTO: 0.03 K/UL (ref 0–0.5)
IMM GRANULOCYTES NFR BLD AUTO: 0.3 % (ref 0–5)
LYMPHOCYTES # BLD: 4.06 K/UL (ref 0.5–4.6)
LYMPHOCYTES NFR BLD: 39 % (ref 13–44)
MAGNESIUM SERPL-MCNC: 1.8 MG/DL (ref 1.8–2.4)
MCH RBC QN AUTO: 30.7 PG (ref 26.1–32.9)
MCHC RBC AUTO-ENTMCNC: 35.9 G/DL (ref 31.4–35)
MCV RBC AUTO: 85.5 FL (ref 82–102)
MONOCYTES # BLD: 1.23 K/UL (ref 0.1–1.3)
MONOCYTES NFR BLD: 11.8 % (ref 4–12)
NEUTS SEG # BLD: 4.8 K/UL (ref 1.7–8.2)
NEUTS SEG NFR BLD: 46.1 % (ref 43–78)
NRBC # BLD: 0 K/UL (ref 0–0.2)
PLATELET # BLD AUTO: 206 K/UL (ref 150–450)
PMV BLD AUTO: 10.7 FL (ref 9.4–12.3)
POTASSIUM SERPL-SCNC: 3.7 MMOL/L (ref 3.5–5.1)
PROT SERPL-MCNC: 7.2 G/DL (ref 6.3–8.2)
RBC # BLD AUTO: 5.44 M/UL (ref 4.23–5.6)
SODIUM SERPL-SCNC: 137 MMOL/L (ref 136–145)
TROPONIN T SERPL HS-MCNC: 6 NG/L (ref 0–22)
TROPONIN T SERPL HS-MCNC: 7 NG/L (ref 0–22)
WBC # BLD AUTO: 10.4 K/UL (ref 4.3–11.1)

## 2025-03-26 PROCEDURE — 83735 ASSAY OF MAGNESIUM: CPT

## 2025-03-26 PROCEDURE — 6360000004 HC RX CONTRAST MEDICATION: Performed by: EMERGENCY MEDICINE

## 2025-03-26 PROCEDURE — 71260 CT THORAX DX C+: CPT

## 2025-03-26 PROCEDURE — 93005 ELECTROCARDIOGRAM TRACING: CPT | Performed by: EMERGENCY MEDICINE

## 2025-03-26 PROCEDURE — 99285 EMERGENCY DEPT VISIT HI MDM: CPT

## 2025-03-26 PROCEDURE — 84484 ASSAY OF TROPONIN QUANT: CPT

## 2025-03-26 PROCEDURE — 85025 COMPLETE CBC W/AUTO DIFF WBC: CPT

## 2025-03-26 PROCEDURE — 93010 ELECTROCARDIOGRAM REPORT: CPT | Performed by: INTERNAL MEDICINE

## 2025-03-26 PROCEDURE — 80053 COMPREHEN METABOLIC PANEL: CPT

## 2025-03-26 RX ORDER — IOPAMIDOL 755 MG/ML
100 INJECTION, SOLUTION INTRAVASCULAR
Status: COMPLETED | OUTPATIENT
Start: 2025-03-26 | End: 2025-03-26

## 2025-03-26 RX ADMIN — IOPAMIDOL 100 ML: 755 INJECTION, SOLUTION INTRAVENOUS at 18:39

## 2025-03-26 ASSESSMENT — PAIN DESCRIPTION - DESCRIPTORS: DESCRIPTORS: ACHING

## 2025-03-26 ASSESSMENT — PAIN DESCRIPTION - LOCATION: LOCATION: CHEST

## 2025-03-26 ASSESSMENT — PAIN - FUNCTIONAL ASSESSMENT: PAIN_FUNCTIONAL_ASSESSMENT: 0-10

## 2025-03-26 ASSESSMENT — PAIN SCALES - GENERAL: PAINLEVEL_OUTOF10: 1

## 2025-03-26 ASSESSMENT — PAIN DESCRIPTION - ORIENTATION: ORIENTATION: MID

## 2025-03-26 NOTE — ED TRIAGE NOTES
Patient arrives to ED complaining of SOB. Patient reports he has history of PE. Patient was placed on Eliquis. Patient reports his symptoms are similar to when he had it in January. Patient reports pressure to his chest. Patient became lightheaded and flushed during triage. Patient wad clammy and felt like he was going to pass out.

## 2025-03-26 NOTE — ED PROVIDER NOTES
available for review.    CONTRAST: Patient was intravenously administered 100 mL of  Isovue 370.    COMPARISON: CXR March 4, 2025.    FINDINGS:    Lungs: Unremarkable. No consolidation.   Pleural spaces: No pneumothorax or pleural effusion is detected.  Heart: The heart is not enlarged.    No significant pericardial fluid is seen.    Vasculature: Scattered atherosclerosis is seen in a nondilated thoracic aorta.   No evidence of aortic dissection. There is normal opacification of the pulmonary  artery.  No PE is detected.  Mediastinum: No mediastinal mass or adenopathy is visualized.    Upper abdomen: No acute abnormality is seen in the upper abdomen.  Cholecystectomy.    Bones/joints: No acute osseous abnormality is identified        Impression    No acute pulmonary process is detected.        Electronically signed by Alfred Heck   CBC with Auto Differential   Result Value Ref Range    WBC 10.4 4.3 - 11.1 K/uL    RBC 5.44 4.23 - 5.6 M/uL    Hemoglobin 16.7 13.6 - 17.2 g/dL    Hematocrit 46.5 41.1 - 50.3 %    MCV 85.5 82 - 102 FL    MCH 30.7 26.1 - 32.9 PG    MCHC 35.9 (H) 31.4 - 35.0 g/dL    RDW 13.1 11.9 - 14.6 %    Platelets 206 150 - 450 K/uL    MPV 10.7 9.4 - 12.3 FL    nRBC 0.00 0.0 - 0.2 K/uL    Differential Type AUTOMATED      Neutrophils % 46.1 43.0 - 78.0 %    Lymphocytes % 39.0 13.0 - 44.0 %    Monocytes % 11.8 4.0 - 12.0 %    Eosinophils % 2.0 0.5 - 7.8 %    Basophils % 0.8 0.0 - 2.0 %    Immature Granulocytes % 0.3 0.0 - 5.0 %    Neutrophils Absolute 4.80 1.70 - 8.20 K/UL    Lymphocytes Absolute 4.06 0.50 - 4.60 K/UL    Monocytes Absolute 1.23 0.10 - 1.30 K/UL    Eosinophils Absolute 0.21 0.00 - 0.80 K/UL    Basophils Absolute 0.08 0.00 - 0.20 K/UL    Immature Granulocytes Absolute 0.03 0.0 - 0.5 K/UL   Comprehensive Metabolic Panel   Result Value Ref Range    Sodium 137 136 - 145 mmol/L    Potassium 3.7 3.5 - 5.1 mmol/L    Chloride 101 98 - 107 mmol/L    CO2 22 20 - 29 mmol/L    Anion Gap 15 7 - 16

## 2025-03-27 NOTE — DISCHARGE INSTRUCTIONS
Continue current medications.  Follow-up with [facility] cardiology when called with appointment time to be considered for an outpatient stress test.  Alternatively call your cardiologist at [facility] tomorrow morning to schedule a follow-up visit in stress test if they deem appropriate.  Return if any new, worsening or concerning symptoms.  Call [facility] cardiology in 48 hours if you do not hear from them regarding this appointment

## 2025-03-31 ENCOUNTER — INITIAL CONSULT (OUTPATIENT)
Age: 70
End: 2025-03-31
Payer: MEDICARE

## 2025-03-31 VITALS
HEIGHT: 71 IN | SYSTOLIC BLOOD PRESSURE: 130 MMHG | WEIGHT: 193 LBS | BODY MASS INDEX: 27.02 KG/M2 | DIASTOLIC BLOOD PRESSURE: 92 MMHG | HEART RATE: 76 BPM

## 2025-03-31 DIAGNOSIS — I10 ESSENTIAL HYPERTENSION: ICD-10-CM

## 2025-03-31 DIAGNOSIS — E78.5 DYSLIPIDEMIA: ICD-10-CM

## 2025-03-31 DIAGNOSIS — R93.1 AGATSTON CORONARY ARTERY CALCIUM SCORE LESS THAN 100: ICD-10-CM

## 2025-03-31 DIAGNOSIS — I95.1 ORTHOSTATIC HYPOTENSION: ICD-10-CM

## 2025-03-31 DIAGNOSIS — I27.82 CHRONIC PULMONARY EMBOLISM, UNSPECIFIED PULMONARY EMBOLISM TYPE, UNSPECIFIED WHETHER ACUTE COR PULMONALE PRESENT (HCC): Primary | ICD-10-CM

## 2025-03-31 DIAGNOSIS — R07.9 CHEST PAIN, UNSPECIFIED TYPE: ICD-10-CM

## 2025-03-31 PROCEDURE — G8428 CUR MEDS NOT DOCUMENT: HCPCS | Performed by: INTERNAL MEDICINE

## 2025-03-31 PROCEDURE — G8419 CALC BMI OUT NRM PARAM NOF/U: HCPCS | Performed by: INTERNAL MEDICINE

## 2025-03-31 PROCEDURE — 1036F TOBACCO NON-USER: CPT | Performed by: INTERNAL MEDICINE

## 2025-03-31 PROCEDURE — 3080F DIAST BP >= 90 MM HG: CPT | Performed by: INTERNAL MEDICINE

## 2025-03-31 PROCEDURE — 99205 OFFICE O/P NEW HI 60 MIN: CPT | Performed by: INTERNAL MEDICINE

## 2025-03-31 PROCEDURE — 3075F SYST BP GE 130 - 139MM HG: CPT | Performed by: INTERNAL MEDICINE

## 2025-03-31 PROCEDURE — 1123F ACP DISCUSS/DSCN MKR DOCD: CPT | Performed by: INTERNAL MEDICINE

## 2025-03-31 PROCEDURE — 3017F COLORECTAL CA SCREEN DOC REV: CPT | Performed by: INTERNAL MEDICINE

## 2025-03-31 PROCEDURE — 1126F AMNT PAIN NOTED NONE PRSNT: CPT | Performed by: INTERNAL MEDICINE

## 2025-03-31 RX ORDER — AMLODIPINE AND VALSARTAN 10; 320 MG/1; MG/1
1 TABLET ORAL DAILY
COMMUNITY

## 2025-03-31 NOTE — PROGRESS NOTES
[facility] CARDIOLOGY, [address], [address]  [place] [address]  PHONE: [phone]    SUBJECTIVE: /HPI  Juan C LEESA Roe (1955) is a 69 y.o. male seen for a follow up visit regarding the following:   Specialty Problems          Cardiology Problems    Pulmonary embolism (HCC)        Essential hypertension        Orthostatic hypotension         Mr Roe is 69 year old male who presents for a hospital follow up. He was walking 1 week ago and felt a pressure in his chest and legs that lasted one second and went away. He went to the ED on 3/26 due to chest pain and shortness of breath. Workup showed no PE, he wore a heart monitor for 15 days which showed no abnormalities.  He has Factor V Leiden and started getting Pulmonary Embolisms in January 28th and has been on Eliquis since.     His internist prescribed metoprolol for a hypertensive episode 2 years ago and he is endorsing numbness and coldness in his hands and feet.     BP in office 130/92, home BP's read 130-140/80-90    He had a calcium score in 2019 that was 66; would want to look into getting another.     Duplex Carotid US showed no stenosis in ELHAM and small amount of plaque in the left carotid bifurcation.     Hasn't had an ECHO since 2019, cannot read the report.     Past Medical History, Past Surgical History, Family history, Social History, and Medications were all reviewed with the patient today and updated as necessary.    Allergies   Allergen Reactions    Penicillins Other (See Comments)     Patient states he was 5 and is unsure of reaction. Patient is able to take Amoxicillin without a reaction.  Tolerates ceftriaxone    Sulfa Antibiotics Rash     Past Medical History:   Diagnosis Date    Factor 5 Leiden mutation, heterozygous     GERD (gastroesophageal reflux disease)     Hypertension     managed with meds    IBS (irritable bowel syndrome)     Nausea & vomiting     Thyroid disease     hypo     Past Surgical History:   Procedure

## 2025-06-05 RX ORDER — METOPROLOL SUCCINATE 50 MG/1
50 TABLET, EXTENDED RELEASE ORAL DAILY
Qty: 90 TABLET | Refills: 3 | Status: SHIPPED | OUTPATIENT
Start: 2025-06-05

## 2025-06-05 NOTE — TELEPHONE ENCOUNTER
Requested Prescriptions     Pending Prescriptions Disp Refills    metoprolol succinate (TOPROL XL) 50 MG extended release tablet 90 tablet 3     Sig: Take 1 tablet by mouth daily         Verified rx. Last OV 3/31/25. Erx to pharm on file.

## (undated) DEVICE — SOL ANTI-FOG 6ML MEDC -- MEDICHOICE - CONVERT TO 358427

## (undated) DEVICE — INTENDED FOR TISSUE SEPARATION, AND OTHER PROCEDURES THAT REQUIRE A SHARP SURGICAL BLADE TO PUNCTURE OR CUT.: Brand: BARD-PARKER SAFETY BLADES SIZE 15, STERILE

## (undated) DEVICE — AMD ANTIMICROBIAL NON-ADHERENT PAD,0.2% POLYHEXAMETHYLENE BIGUANIDE HCI (PHMB): Brand: TELFA

## (undated) DEVICE — KENDALL SCD EXPRESS SLEEVES, KNEE LENGTH, MEDIUM: Brand: KENDALL SCD

## (undated) DEVICE — TROCARS: Brand: KII® BLUNT TIP ACCESS SYSTEM

## (undated) DEVICE — SUTURE SZ 0 27IN 5/8 CIR UR-6  TAPER PT VIOLET ABSRB VICRYL J603H

## (undated) DEVICE — LOGICUT SCISSOR LENGTH 320MM: Brand: LOGI - LAPAROSCOPIC INSTRUMENT SYSTEM

## (undated) DEVICE — BAG SPEC RETRV 275ML 10ML DISPOSABLE RELIACATCH

## (undated) DEVICE — CLIP APPLIER: Brand: ENDO CLIP II

## (undated) DEVICE — 3M™ TEGADERM™ TRANSPARENT FILM DRESSING FRAME STYLE, 1624W, 2-3/8 IN X 2-3/4 IN (6 CM X 7 CM), 100/CT 4CT/CASE: Brand: 3M™ TEGADERM™

## (undated) DEVICE — [HIGH FLOW INSUFFLATOR,  DO NOT USE IF PACKAGE IS DAMAGED,  KEEP DRY,  KEEP AWAY FROM SUNLIGHT,  PROTECT FROM HEAT AND RADIOACTIVE SOURCES.]: Brand: PNEUMOSURE

## (undated) DEVICE — SLIM BODY SKIN STAPLER: Brand: APPOSE ULC

## (undated) DEVICE — CONTAINER SPEC FRMLN 120ML --

## (undated) DEVICE — TRAY PREP DRY W/ PREM GLV 2 APPL 6 SPNG 2 UNDPD 1 OVERWRAP

## (undated) DEVICE — LAP CHOLE: Brand: MEDLINE INDUSTRIES, INC.

## (undated) DEVICE — REM POLYHESIVE ADULT PATIENT RETURN ELECTRODE: Brand: VALLEYLAB

## (undated) DEVICE — STANDARD HYPODERMIC NEEDLE,POLYPROPYLENE HUB: Brand: MONOJECT

## (undated) DEVICE — BLADELESS OPTICAL TROCAR WITH FIXATION CANNULA: Brand: VERSAPORT

## (undated) DEVICE — SOLUTION IV 1000ML 0.9% SOD CHL

## (undated) DEVICE — UNIVERSAL FIXATION CANNULA: Brand: VERSAONE